# Patient Record
Sex: FEMALE | Race: WHITE | NOT HISPANIC OR LATINO | Employment: OTHER
[De-identification: names, ages, dates, MRNs, and addresses within clinical notes are randomized per-mention and may not be internally consistent; named-entity substitution may affect disease eponyms.]

---

## 2017-06-10 ENCOUNTER — HEALTH MAINTENANCE LETTER (OUTPATIENT)
Age: 62
End: 2017-06-10

## 2018-07-20 ENCOUNTER — TELEPHONE (OUTPATIENT)
Dept: OTOLARYNGOLOGY | Facility: CLINIC | Age: 63
End: 2018-07-20

## 2018-07-20 NOTE — TELEPHONE ENCOUNTER
PATIENT CALLED BACK WAS ONLY SEEN AT [facility]. I CALLED TO GET MRI'S PUSHED TO PACS AND TO FAX OVER AUDIOGRAM ALL THE RECORDS ARE IN CARE EVERYWHERE

## 2018-07-25 NOTE — TELEPHONE ENCOUNTER
FUTURE VISIT INFORMATION      FUTURE VISIT INFORMATION:    Date: 8-7-18    Time:     Location:   REFERRAL INFORMATION:    Referring provider:  self    Referring providers clinic:      Reason for visit/diagnosis  meniere's consult    RECORDS REQUESTED FROM:       Clinic name Comments Records Status Imaging Status   All records internal                                       RECORDS STATUS

## 2018-08-06 DIAGNOSIS — H81.09 MENIERE'S DISEASE: Primary | ICD-10-CM

## 2018-08-07 ENCOUNTER — OFFICE VISIT (OUTPATIENT)
Dept: AUDIOLOGY | Facility: CLINIC | Age: 63
End: 2018-08-07
Payer: COMMERCIAL

## 2018-08-07 ENCOUNTER — OFFICE VISIT (OUTPATIENT)
Dept: OTOLARYNGOLOGY | Facility: CLINIC | Age: 63
End: 2018-08-07
Payer: COMMERCIAL

## 2018-08-07 ENCOUNTER — PRE VISIT (OUTPATIENT)
Dept: OTOLARYNGOLOGY | Facility: CLINIC | Age: 63
End: 2018-08-07

## 2018-08-07 VITALS — HEIGHT: 64 IN | WEIGHT: 122 LBS | BODY MASS INDEX: 20.83 KG/M2

## 2018-08-07 DIAGNOSIS — H81.02 MENIERE'S DISEASE, LEFT: Primary | ICD-10-CM

## 2018-08-07 DIAGNOSIS — H93.8X3 EAR FULLNESS, BILATERAL: ICD-10-CM

## 2018-08-07 DIAGNOSIS — H93.8X2 EAR PRESSURE, LEFT: ICD-10-CM

## 2018-08-07 DIAGNOSIS — H93.12 TINNITUS OF LEFT EAR: ICD-10-CM

## 2018-08-07 DIAGNOSIS — H90.42 SENSORINEURAL HEARING LOSS (SNHL) OF LEFT EAR WITH UNRESTRICTED HEARING OF RIGHT EAR: ICD-10-CM

## 2018-08-07 DIAGNOSIS — H93.12 TINNITUS, LEFT: ICD-10-CM

## 2018-08-07 DIAGNOSIS — H61.23 EXCESSIVE CERUMEN IN BOTH EAR CANALS: ICD-10-CM

## 2018-08-07 DIAGNOSIS — H90.42 SENSORINEURAL HEARING LOSS (SNHL) OF LEFT EAR WITH UNRESTRICTED HEARING OF RIGHT EAR: Primary | ICD-10-CM

## 2018-08-07 RX ORDER — LIDOCAINE 50 MG/G
PATCH TOPICAL
COMMUNITY
Start: 2017-12-01 | End: 2023-04-02

## 2018-08-07 RX ORDER — ESTRADIOL 10 UG/1
10 INSERT VAGINAL
COMMUNITY
Start: 2018-05-03

## 2018-08-07 RX ORDER — AMLODIPINE BESYLATE 5 MG/1
5 TABLET ORAL DAILY
COMMUNITY

## 2018-08-07 RX ORDER — TRAMADOL HYDROCHLORIDE 50 MG/1
50 TABLET ORAL 4 TIMES DAILY PRN
COMMUNITY

## 2018-08-07 RX ORDER — PREGABALIN 75 MG/1
75 CAPSULE ORAL 3 TIMES DAILY
COMMUNITY

## 2018-08-07 RX ORDER — CLONAZEPAM 0.5 MG/1
0.5 TABLET ORAL 2 TIMES DAILY
COMMUNITY
Start: 2018-07-06

## 2018-08-07 RX ORDER — B-COMPLEX WITH VITAMIN C
50 TABLET ORAL 2 TIMES DAILY WITH MEALS
Qty: 120 TABLET | Refills: 4 | Status: SHIPPED | OUTPATIENT
Start: 2018-08-07 | End: 2018-10-06

## 2018-08-07 RX ORDER — VENLAFAXINE HYDROCHLORIDE 225 MG/1
225 TABLET, EXTENDED RELEASE ORAL DAILY
COMMUNITY

## 2018-08-07 RX ORDER — HYDROCHLOROTHIAZIDE 25 MG/1
25 TABLET ORAL DAILY
COMMUNITY

## 2018-08-07 RX ORDER — DIPHENHYDRAMINE HCL 25 MG
25 TABLET ORAL AT BEDTIME
Qty: 60 TABLET | Refills: 4 | Status: SHIPPED | OUTPATIENT
Start: 2018-08-07 | End: 2018-10-06

## 2018-08-07 ASSESSMENT — PATIENT HEALTH QUESTIONNAIRE - PHQ9
10. IF YOU CHECKED OFF ANY PROBLEMS, HOW DIFFICULT HAVE THESE PROBLEMS MADE IT FOR YOU TO DO YOUR WORK, TAKE CARE OF THINGS AT HOME, OR GET ALONG WITH OTHER PEOPLE: SOMEWHAT DIFFICULT
SUM OF ALL RESPONSES TO PHQ QUESTIONS 1-9: 12
SUM OF ALL RESPONSES TO PHQ QUESTIONS 1-9: 12

## 2018-08-07 ASSESSMENT — PAIN SCALES - GENERAL: PAINLEVEL: NO PAIN (0)

## 2018-08-07 NOTE — PROGRESS NOTES
Dear Ambrose Moon:    I had the pleasure of meeting Madelin Story in consultation today at the [facility] at your request.    HISTORY OF PRESENT ILLNESS: Patient is a 62-year-old in today for assessment of recent dizziness.  She also has hearing loss in the left ear, left tinnitus, and left ear pressure.  There has been discussion for possible left Ménière's.  Symptoms began about 2014 when she noticed hearing loss in the left ear.  She feels this has been progressing over the years since.  She also has had left tinnitus since then, it is constant although varies in severity.  She thinks the hearing loss and tinnitus increases with episodes of dizziness.  She also has left ear pressure, again this fluctuate and varies.  About 4 months ago she began noticing episodes of vertigo.  They are quite severe and associated with nausea.  She says she is unable to function when the attacks occur.  She does have nausea but has not vomited.  She has to just lay still until they pass, once they pass her balance is fine.  She denies any dysphasia, hoarseness, facial paresthesias.  She has seen an ENT physician elsewhere and was put on hydrochlorothiazide.  She did have an MRI scan July 10 which was normal by report.  She comes in today for assessment of above symptoms and discussion for treatment options.    ALLERGIES:    Allergies   Allergen Reactions     Penicillin [Penicillins] Swelling     Sulfa Drugs Nausea and Vomiting     Cefuroxime Nausea and Vomiting     PN: Vomiting     Clindamycin Rash     Gadolinium Derivatives Itching     PN: Just itching. Gadavist given on 7/9/14 for MR Brain WWO exam.        HABITS:   Alcohol use No    History   Smoking Status     Former Smoker     Quit date: 11/20/1976   Smokeless Tobacco     Never Used         PAST MEDICAL HISTORY: Please see today's intake form (for the remainder of the PMH) which I reviewed and signed.  No past medical history on  file.    FAMILY HISTORY/SOCIAL HISTORY: No family history on file. Social History     Social History     Marital status:      Spouse name: N/A     Number of children: 4     Years of education: N/A     Occupational History      None      Social History Main Topics     Smoking status: Former Smoker     Quit date: 11/20/1976     Smokeless tobacco: Never Used     Alcohol use No     Drug use: No     Sexual activity: Yes     Partners: Male     Birth control/ protection: Surgical      Comment: hysterectomy     Other Topics Concern     Not on file     Social History Narrative     Family History:  No immediate family history of ear problems, ear surgeries, dizziness or significant hearing issues.      REVIEW OF SYSTEMS: Patient Supplied Answers to Review of Systems   ENT ROS 8/7/2018   Constitutional Appetite change   Neurology Dizzy spells, Headache   Psychology Frequently feeling anxious   Ears, Nose, Throat Hearing loss, Ear pain, Ringing/noise in ears, Nasal congestion or drainage   Hematologic Easy bruising   Endocrine Frequent urination       The remainder of the 10 point ROS is negative    PHYSICIAL EXAMINATION:  Constitutional: The patient was well-groomed and in no acute distress.   Skin: Warm and pink.  Psychiatric: The patient's affect was calm, cooperative, and appropriate.   Respiratory: Breathing comfortably without stridor or exertion of accessory muscles.  Eyes: Pupils were equal and reactive. Extraocular movement intact.   Head: Normocephalic and atraumatic. No lesions or scars.  Ears: Both ears examined under the microscope for microscopic assessment and cleaning of cerumen.  Right side was cleaned with curettes.  Following cleaning, TM and middle ear looked normal.  Left ear was cleaned and examined using microscope, curette, and similar techniques.  TM and middle ear looked normal after cleaning.  Nose: Sinuses were nontender. Anterior rhinoscopy revealed midline septum and absence of purulence  or polyps.  Oral Cavity: Normal tongue, floor of mouth, buccal mucosa, and palate. No lesions or masses on inspection or palpation. No abnormal lymph tissue in the oropharynx.   Neck: The parotid is soft without masses. Supple with normal laryngeal and tracheal landmarks.   Lymphatic: There is no palpable lymphadenopathy or other masses in the neck.   Neurologic: Alert and oriented x 3. Cranial nerves III-XI within normal limits. Voice quality normal.  Cerebellar Function Tests:  Grossly normal    Audiogram: Audiogram performed shows normal hearing in the right ear.  She has a severe left sensorineural hearing loss with only 24% discrimination.  Right side shows 100% discrimination level.    MRI scan: Gadolinium was given and shows normal scan by report, we will get scans sent here for review.      IMPRESSION AND PLAN:   1. Left Ménière's: Description of symptoms certainly fit diagnosis of Ménière's and this was discussed in detail with them.  Would recommend a trial for addition of niacin and Benadryl to the diuretic she has been on.  Also discussed low-salt diet and avoidance of excess caffeine.  With the frequency and severity of attacks, severe hearing loss, also recommended a left transtympanic steroid injection.  Risk of this with persistent perforation, drainage, continued symptoms, unforeseen complications etc. all discussed.  Multiple questions answered.  They desire to proceed and injection completed without incident.  2. Left severe sensorineural hearing loss: Secondary to Ménière's and treatment as above begun, monitor for now.  3. Dizziness: Secondary to Ménière's and monitor with treatment that has been started.  4. Left tinnitus: Secondary to severe sensorineural hearing loss and left Ménière's, treatment begun and monitor.  5. Excessive cerumen: Clean today and no further treatment needed at this time, monitor.    PROCEDURE NOTE:  Pt placed supine under the microscope for transtympanic steroid  injection. The left ear was examined. A drop of phenol was placed in the posterior superior quadrant. Dexamethasone in a solution of 24 mg/ml was injected into the left middle ear using a 25 gauge needle. Total injection of 1 ml.  Pt remained supine for 20 minutes and was released to their own care.      Thank you very much for the opportunity to participate in the care of your patient.    Rick L Nissen MD

## 2018-08-07 NOTE — NURSING NOTE
"Chief Complaint   Patient presents with     Consult     New Dx: Meniere's and vertigo. No pain or drainage today. Pt has jaw pain with chewing and being seen at [facility].        Ht 1.613 m (5' 3.5\")  Wt 55.3 kg (122 lb)  BMI 21.27 kg/m2    SHIELA Agustin LPN    "

## 2018-08-07 NOTE — MR AVS SNAPSHOT
After Visit Summary   2018    Madelin Story    MRN: [id number]           Patient Information     Date Of Birth          1955        Visit Information        Provider Department      2018 3:45 PM Nissen, Rick L, MD [facility]        Today's Diagnoses     Meniere's disease, left    -  1      Care Instructions    Doctor Nissen would like to see you back in 2-3 weeks with another hearing test           Follow-ups after your visit        Your next 10 appointments already scheduled     Aug 28, 2018 10:00 AM CDT   Walk In From ENT with Chun Merritt [facility] ([facility])    [address] [place] [id number] [phone]            Aug 28, 2018 11:00 AM CDT   (Arrive by 10:45 AM)   Return Visit with Rick L Nissen, MD [facility] ([facility])    [address] [place] [id number] [phone]              Who to contact     Please call your clinic at [phone] to:    Ask questions about your health    Make or cancel appointments    Discuss your medicines    Learn about your test results    Speak to your doctor            Additional Information About Your Visit        MyChart Information     Vibliot is an electronic gateway that provides easy, online access to your medical records. With [employer]ik[employer], you can request a clinic appointment, read your test results, renew a prescription or communicate with your care team.     To sign up for Vibliot visit the website at [email or web address]"t   You will be asked to enter the access code listed below, as well as some personal information. Please follow the directions to create your username and password.     Your access code is: [email or web address]  Expires: 10/18/2018  3:51 PM     Your access code will  in 90 days. If you need help or a new code, please contact your [facility] or call [phone] for assistance.        Care EveryWhere ID     This is your Care EveryWhere ID. This could be used by other organizations to access your [facility] medical records  [id number]        Your Vitals Were     Height BMI (Body Mass Index)                1.613 m (5' 3.5\") 21.27 kg/m2           Blood Pressure from Last 3 Encounters:   10/24/08 120/78   06/25/07 110/70   01/12/07 130/80    Weight from Last 3 Encounters:   08/07/18 55.3 kg (122 lb)   10/24/08 58.5 kg (129 lb)   06/25/07 61.1 kg (134 lb 9.6 oz)              Today, you had the following     No orders found for display         Today's Medication Changes          These changes are accurate as of 8/7/18  4:51 PM.  If you have any questions, ask your nurse or doctor.               Start taking these medicines.        Dose/Directions    diphenhydrAMINE 25 MG tablet   Commonly known as:  BENADRYL ALLERGY   Used for:  Meniere's disease, left   Started by:  Nissen, Rick L, MD        Dose:  25 mg   Take 1 tablet (25 mg) by mouth At Bedtime   Quantity:  60 tablet   Refills:  4       niacin 50 MG tablet   Used for:  Meniere's disease, left   Started by:  Nissen, Rick L, MD        Dose:  50 mg   Take 1 tablet (50 mg) by mouth 2 times daily (with meals)   Quantity:  120 tablet   Refills:  4         Stop taking these medicines if you haven't already. Please contact your care team if you have questions.     PREMARIN PO   Stopped by:  Nissen, Rick L, MD                Where to get your medicines      These medications were sent to [facility]/pharmacy #[id number] - [place] - [address] AT [place] [address]  [address], [place] [address]     Phone:  [phone]     diphenhydrAMINE 25 MG tablet    niacin 50 MG tablet               Information about OPIOIDS     PRESCRIPTION OPIOIDS: WHAT YOU NEED TO KNOW   We gave you an opioid (narcotic) pain medicine. It is important to manage your pain, but opioids are not always the best " choice. You should first try all the other options your care team gave you. Take this medicine for as short a time (and as few doses) as possible.    Some activities can increase your pain, such as bandage changes or therapy sessions. It may help to take your pain medicine 30 to 60 minutes before these activities. Reduce your stress by getting enough sleep, working on hobbies you enjoy and practicing relaxation or meditation. Talk to your care team about ways to manage your pain beyond prescription opioids.    These medicines have risks:    DO NOT drive when on new or higher doses of pain medicine. These medicines can affect your alertness and reaction times, and you could be arrested for driving under the influence (DUI). If you need to use opioids long-term, talk to your care team about driving.    DO NOT operate heavy machinery    DO NOT do any other dangerous activities while taking these medicines.    DO NOT drink any alcohol while taking these medicines.     If the opioid prescribed includes acetaminophen, DO NOT take with any other medicines that contain acetaminophen. Read all labels carefully. Look for the word  acetaminophen  or  Tylenol.  Ask your pharmacist if you have questions or are unsure.    You can get addicted to pain medicines, especially if you have a history of addiction (chemical, alcohol or substance dependence). Talk to your care team about ways to reduce this risk.    All opioids tend to cause constipation. Drink plenty of water and eat foods that have a lot of fiber, such as fruits, vegetables, prune juice, apple juice and high-fiber cereal. Take a laxative (Miralax, milk of magnesia, Colace, Senna) if you don t move your bowels at least every other day. Other side effects include upset stomach, sleepiness, dizziness, throwing up, tolerance (needing more of the medicine to have the same effect), physical dependence and slowed breathing.    Store your pills in a secure place, locked if  possible. We will not replace any lost or stolen medicine. If you don t finish your medicine, please throw away (dispose) as directed by your pharmacist. The [place] Pollution Control Agency has more information about safe disposal: [email or web address]/living-[email or web address]/[email or web address]-unwanted-medications         Primary Care Provider Office Phone # Fax #    Ambrose Jacob [phone]       XXX NO INFO FOUND XXX XXX  XXX [place] [address]        Equal Access to Services     ELEUTERIO LOPEZ : Hadii aad ku hadasho Soomaali, waaxda luqadaha, qaybta kaalmada adeegyada, waxay idiin hayaan adeeg kharash francois . So [facility] [phone].    ATENCIÓN: Si habla español, tiene a giron disposición servicios gratuitos de asistencia lingüística. [facility] [phone].    We comply with applicable federal civil rights laws and [place] laws. We do not discriminate on the basis of race, color, national origin, age, disability, sex, sexual orientation, or gender identity.            Thank you!     Thank you for choosing [facility]  for your care. Our goal is always to provide you with excellent care. Hearing back from our patients is one way we can continue to improve our services. Please take a few minutes to complete the written survey that you may receive in the mail after your visit with us. Thank you!             Your Updated Medication List - Protect others around you: Learn how to safely use, store and throw away your medicines at www.disposemymeds.org.          This list is accurate as of 8/7/18  4:51 PM.  Always use your most recent med list.                   Brand Name Dispense Instructions for use Diagnosis    amLODIPine 5 MG tablet    NORVASC     Take 5 mg by mouth        clonazePAM 0.5 MG tablet    klonoPIN     Take 0.5 mg by mouth        diphenhydrAMINE 25 MG tablet    BENADRYL ALLERGY    60 tablet    Take 1 tablet (25 mg) by mouth At Bedtime    Meniere's disease, left       estradiol 10 MCG  Tabs vaginal tablet    VAGIFEM     Place 10 mcg vaginally        hydrochlorothiazide 25 MG tablet    HYDRODIURIL     Take 25 mg by mouth        ibuprofen 200 MG tablet    ADVIL/MOTRIN    120    2-3  hs        lidocaine 5 % Patch    LIDODERM     PLACE 1 PATCH ONTO THE SKIN EVERY 24 HOURS. (ON 12 AND OFF FOR 12 HRS)        niacin 50 MG tablet     120 tablet    Take 1 tablet (50 mg) by mouth 2 times daily (with meals)    Meniere's disease, left       pregabalin 75 MG capsule    LYRICA     Take 75 mg by mouth        traMADol 50 MG tablet    ULTRAM     Take 50 mg by mouth        traZODone 50 MG tablet    DESYREL    3 MONTHS    1 1/2 Q HS    Localized osteoarthrosis not specified whether primary or secondary, upper arm, Sleep disturbance, unspecified       venlafaxine 225 MG Tb24 24 hr tablet    EFFEXOR-ER     Take 225 mg by mouth

## 2018-08-07 NOTE — LETTER
8/7/2018       RE: Madelin Story  [address] [place] [address]     Dear Colleague,    Thank you for referring your patient, Madelin Story, to the [facility] at [facility]. Please see a copy of my visit note below.    Dear Ambrose Moon:    I had the pleasure of meeting Madelin Story in consultation today at the [facility] at your request.    HISTORY OF PRESENT ILLNESS: Patient is a 62-year-old in today for assessment of recent dizziness.  She also has hearing loss in the left ear, left tinnitus, and left ear pressure.  There has been discussion for possible left Ménière's.  Symptoms began about 2014 when she noticed hearing loss in the left ear.  She feels this has been progressing over the years since.  She also has had left tinnitus since then, it is constant although varies in severity.  She thinks the hearing loss and tinnitus increases with episodes of dizziness.  She also has left ear pressure, again this fluctuate and varies.  About 4 months ago she began noticing episodes of vertigo.  They are quite severe and associated with nausea.  She says she is unable to function when the attacks occur.  She does have nausea but has not vomited.  She has to just lay still until they pass, once they pass her balance is fine.  She denies any dysphasia, hoarseness, facial paresthesias.  She has seen an ENT physician elsewhere and was put on hydrochlorothiazide.  She did have an MRI scan July 10 which was normal by report.  She comes in today for assessment of above symptoms and discussion for treatment options.    ALLERGIES:    Allergies   Allergen Reactions     Penicillin [Penicillins] Swelling     Sulfa Drugs Nausea and Vomiting     Cefuroxime Nausea and Vomiting     PN: Vomiting     Clindamycin Rash     Gadolinium Derivatives Itching     PN: Just itching. Gadavist given on 7/9/14 for MR Brain WWO exam.         HABITS:   Alcohol use No    History   Smoking Status     Former Smoker     Quit date: 11/20/1976   Smokeless Tobacco     Never Used         PAST MEDICAL HISTORY: Please see today's intake form (for the remainder of the PMH) which I reviewed and signed.  No past medical history on file.    FAMILY HISTORY/SOCIAL HISTORY: No family history on file. Social History     Social History     Marital status:      Spouse name: N/A     Number of children: 4     Years of education: N/A     Occupational History      None      Social History Main Topics     Smoking status: Former Smoker     Quit date: 11/20/1976     Smokeless tobacco: Never Used     Alcohol use No     Drug use: No     Sexual activity: Yes     Partners: Male     Birth control/ protection: Surgical      Comment: hysterectomy     Other Topics Concern     Not on file     Social History Narrative       REVIEW OF SYSTEMS: Patient Supplied Answers to Review of Systems   ENT ROS 8/7/2018   Constitutional Appetite change   Neurology Dizzy spells, Headache   Psychology Frequently feeling anxious   Ears, Nose, Throat Hearing loss, Ear pain, Ringing/noise in ears, Nasal congestion or drainage   Hematologic Easy bruising   Endocrine Frequent urination       The remainder of the 10 point ROS is negative    PHYSICIAL EXAMINATION:  Constitutional: The patient was well-groomed and in no acute distress.   Skin: Warm and pink.  Psychiatric: The patient's affect was calm, cooperative, and appropriate.   Respiratory: Breathing comfortably without stridor or exertion of accessory muscles.  Eyes: Pupils were equal and reactive. Extraocular movement intact.   Head: Normocephalic and atraumatic. No lesions or scars.  Ears: Both ears examined under the microscope for microscopic assessment and cleaning of cerumen.  Right side was cleaned with curettes.  Following cleaning, TM and middle ear looked normal.  Left ear was cleaned and examined using microscope, curette, and  similar techniques.  TM and middle ear looked normal after cleaning.  Nose: Sinuses were nontender. Anterior rhinoscopy revealed midline septum and absence of purulence or polyps.  Oral Cavity: Normal tongue, floor of mouth, buccal mucosa, and palate. No lesions or masses on inspection or palpation. No abnormal lymph tissue in the oropharynx.   Neck: The parotid is soft without masses. Supple with normal laryngeal and tracheal landmarks.   Lymphatic: There is no palpable lymphadenopathy or other masses in the neck.   Neurologic: Alert and oriented x 3. Cranial nerves III-XI within normal limits. Voice quality normal.  Cerebellar Function Tests:  Grossly normal    Audiogram: Audiogram performed shows normal hearing in the right ear.  She has a severe left sensorineural hearing loss with only 24% discrimination.  Right side shows 100% discrimination level.    MRI scan: Gadolinium was given and shows normal scan by report, we will get scans sent here for review.      IMPRESSION AND PLAN:   1. Left Ménière's: Description of symptoms certainly fit diagnosis of Ménière's and this was discussed in detail with them.  Would recommend a trial for addition of niacin and Benadryl to the diuretic she has been on.  Also discussed low-salt diet and avoidance of excess caffeine.  With the frequency and severity of attacks, severe hearing loss, also recommended a left transtympanic steroid injection.  Risk of this with persistent perforation, drainage, continued symptoms, unforeseen complications etc. all discussed.  Multiple questions answered.  They desire to proceed and injection completed without incident.  2. Left severe sensorineural hearing loss: Secondary to Ménière's and treatment as above begun, monitor for now.  3. Dizziness: Secondary to Ménière's and monitor with treatment that has been started.  4. Left tinnitus: Secondary to severe sensorineural hearing loss and left Ménière's, treatment begun and  monitor.  5. Excessive cerumen: Clean today and no further treatment needed at this time, monitor.    Thank you very much for the opportunity to participate in the care of your patient.    Rick L Nissen MD

## 2018-08-07 NOTE — PROGRESS NOTES
AUDIOLOGY REPORT    SUMMARY: Audiology visit completed. See audiogram for results.      RECOMMENDATIONS: Follow-up with ENT.      Chun Ocasio  Audiologist  [place] License  #[id number]

## 2018-08-07 NOTE — MR AVS SNAPSHOT
After Visit Summary   2018    Madelin Story    MRN: [id number]           Patient Information     Date Of Birth          1955        Visit Information        Provider Department      2018 2:00 PM Chel Goldsmith AuD [facility] Audiology        Today's Diagnoses     Sensorineural hearing loss (SNHL) of left ear with unrestricted hearing of right ear    -  1    Tinnitus of left ear        Ear fullness, bilateral           Follow-ups after your visit        Your next 10 appointments already scheduled     Aug 07, 2018  3:45 PM CDT   (Arrive by 3:30 PM)   New Patient Visit with Rick L Nissen, MD M [facility] Ear Nose and Throat ([facility])    [address] [address] [phone]              Who to contact     Please call your clinic at [phone] to:    Ask questions about your health    Make or cancel appointments    Discuss your medicines    Learn about your test results    Speak to your doctor            Additional Information About Your Visit        MyChart Information     [employer] is an electronic gateway that provides easy, online access to your medical records. With [employer], you can request a clinic appointment, read your test results, renew a prescription or communicate with your care team.     To sign up for [employer] visit the website at [email or web address]   You will be asked to enter the access code listed below, as well as some personal information. Please follow the directions to create your username and password.     Your access code is: [email or web address]  Expires: 10/18/2018  3:51 PM     Your access code will  in 90 days. If you need help or a new code, please contact your [facility] or call [phone] for assistance.        Care EveryWhere ID     This is your Care EveryWhere ID. This could be used by other organizations to access your [facility]  records  [id number]         Blood Pressure from Last 3 Encounters:   10/24/08 120/78   06/25/07 110/70   01/12/07 130/80    Weight from Last 3 Encounters:   10/24/08 58.5 kg (129 lb)   06/25/07 61.1 kg (134 lb 9.6 oz)   01/12/07 64.5 kg (142 lb 3.2 oz)              We Performed the Following     AUDIOGRAM/TYMPANOGRAM - INTERFACE     [facility] Audiometry Thrshld Eval & Speech Recog (14707)     Tymps / Reflex   (16261)          Today's Medication Changes          These changes are accurate as of 8/7/18  3:24 PM.  If you have any questions, ask your nurse or doctor.               Stop taking these medicines if you haven't already. Please contact your care team if you have questions.     PREMARIN PO   Stopped by:  Nissen, Rick L, MD                    Primary Care Provider Office Phone # Fax #    Ambrose Tariq Jacob [phone]       XXX NO INFO FOUND XXX XXX  XXX [place] [address]        Equal Access to Services     [facility]: Hadii aad ku hadasho Soomaali, waaxda luqadaha, qaybta kaalmada adeegyada, savita parrish . So [facility] [phone].    ATENCIÓN: Si habla español, tiene a giron disposición servicios gratuitos de asistencia lingüística. Llame al [phone].    We comply with applicable federal civil rights laws and [place] laws. We do not discriminate on the basis of race, color, national origin, age, disability, sex, sexual orientation, or gender identity.            Thank you!     Thank you for choosing [facility] AUDIOLOGY  for your care. Our goal is always to provide you with excellent care. Hearing back from our patients is one way we can continue to improve our services. Please take a few minutes to complete the written survey that you may receive in the mail after your visit with us. Thank you!             Your Updated Medication List - Protect others around you: Learn how to safely use, store and throw away your medicines at www.disposemymeds.org.          This list is  accurate as of 8/7/18  3:24 PM.  Always use your most recent med list.                   Brand Name Dispense Instructions for use Diagnosis    amLODIPine 5 MG tablet    NORVASC     Take 5 mg by mouth        clonazePAM 0.5 MG tablet    klonoPIN     Take 0.5 mg by mouth        estradiol 10 MCG Tabs vaginal tablet    VAGIFEM     Place 10 mcg vaginally        hydrochlorothiazide 25 MG tablet    HYDRODIURIL     Take 25 mg by mouth        ibuprofen 200 MG tablet    ADVIL/MOTRIN    120    2-3  hs        lidocaine 5 % Patch    LIDODERM     PLACE 1 PATCH ONTO THE SKIN EVERY 24 HOURS. (ON 12 AND OFF FOR 12 HRS)        pregabalin 75 MG capsule    LYRICA     Take 75 mg by mouth        traMADol 50 MG tablet    ULTRAM     Take 50 mg by mouth        traZODone 50 MG tablet    DESYREL    3 MONTHS    1 1/2 Q HS    Localized osteoarthrosis not specified whether primary or secondary, upper arm, Sleep disturbance, unspecified       venlafaxine 225 MG Tb24 24 hr tablet    EFFEXOR-ER     Take 225 mg by mouth

## 2018-08-08 ASSESSMENT — PATIENT HEALTH QUESTIONNAIRE - PHQ9: SUM OF ALL RESPONSES TO PHQ QUESTIONS 1-9: 12

## 2018-08-28 ENCOUNTER — OFFICE VISIT (OUTPATIENT)
Dept: OTOLARYNGOLOGY | Facility: CLINIC | Age: 63
End: 2018-08-28
Payer: COMMERCIAL

## 2018-08-28 ENCOUNTER — OFFICE VISIT (OUTPATIENT)
Dept: AUDIOLOGY | Facility: CLINIC | Age: 63
End: 2018-08-28
Payer: COMMERCIAL

## 2018-08-28 DIAGNOSIS — H81.02 MENIERE'S DISEASE, LEFT: Primary | ICD-10-CM

## 2018-08-28 DIAGNOSIS — H72.92 EAR DRUM PERFORATION, LEFT: ICD-10-CM

## 2018-08-28 DIAGNOSIS — H90.5 SENSORINEURAL HEARING LOSS (SNHL) OF LEFT EAR, UNSPECIFIED HEARING STATUS ON CONTRALATERAL SIDE: ICD-10-CM

## 2018-08-28 DIAGNOSIS — H93.12 TINNITUS, LEFT: ICD-10-CM

## 2018-08-28 DIAGNOSIS — H90.42 SENSORINEURAL HEARING LOSS (SNHL) OF LEFT EAR WITH UNRESTRICTED HEARING OF RIGHT EAR: Primary | ICD-10-CM

## 2018-08-28 ASSESSMENT — PAIN SCALES - GENERAL: PAINLEVEL: NO PAIN (0)

## 2018-08-28 NOTE — MR AVS SNAPSHOT
After Visit Summary   2018    Madelin Story    MRN: [id number]           Patient Information     Date Of Birth          1955        Visit Information        Provider Department      2018 10:00 AM Chel Muniz, Chun VELIZ [facility] Audiology        Today's Diagnoses     Sensorineural hearing loss (SNHL) of left ear with unrestricted hearing of right ear    -  1       Follow-ups after your visit        Who to contact     Please call your clinic at [phone] to:    Ask questions about your health    Make or cancel appointments    Discuss your medicines    Learn about your test results    Speak to your doctor            Additional Information About Your Visit        MyChart Information     [employer] is an electronic gateway that provides easy, online access to your medical records. With [employer], you can request a clinic appointment, read your test results, renew a prescription or communicate with your care team.     To sign up for [employer] visit the website at [email or web address]   You will be asked to enter the access code listed below, as well as some personal information. Please follow the directions to create your username and password.     Your access code is: [email or web address]  Expires: 10/18/2018  3:51 PM     Your access code will  in 90 days. If you need help or a new code, please contact your [facility] or call [phone] for assistance.        Care EveryWhere ID     This is your Care EveryWhere ID. This could be used by other organizations to access your [place] medical records  [id number]         Blood Pressure from Last 3 Encounters:   10/24/08 120/78   07 110/70   07 130/80    Weight from Last 3 Encounters:   18 55.3 kg (122 lb)   10/24/08 58.5 kg (129 lb)   07 61.1 kg (134 lb 9.6 oz)              We Performed the Following     AUDIOGRAM/TYMPANOGRAM - INTERFACE     [facility] Audiometry Thrshld Eval & Speech Recog  (65531)     Tymps / Reflex   (77019)        Primary Care Provider    None Specified       No primary provider on file.        Equal Access to Services     ELEUTERIO LOPEZ : Hadii augustin wilhelm madi Gibbs, amaliada luverito, tiffany kalarisada trudy, savtia parker andressadorcas gonzalez laDarykaveh torres. So [facility] [phone].    ATENCIÓN: Si habla español, tiene a giron disposición servicios gratuitos de asistencia lingüística. Llame al [phone].    We comply with applicable federal civil rights laws and [place] laws. We do not discriminate on the basis of race, color, national origin, age, disability, sex, sexual orientation, or gender identity.            Thank you!     Thank you for choosing [facility] AUDIOLOGY  for your care. Our goal is always to provide you with excellent care. Hearing back from our patients is one way we can continue to improve our services. Please take a few minutes to complete the written survey that you may receive in the mail after your visit with us. Thank you!             Your Updated Medication List - Protect others around you: Learn how to safely use, store and throw away your medicines at www.disposemymeds.org.          This list is accurate as of 8/28/18 11:06 AM.  Always use your most recent med list.                   Brand Name Dispense Instructions for use Diagnosis    amLODIPine 5 MG tablet    NORVASC     Take 5 mg by mouth        clonazePAM 0.5 MG tablet    klonoPIN     Take 0.5 mg by mouth        diphenhydrAMINE 25 MG tablet    BENADRYL ALLERGY    60 tablet    Take 1 tablet (25 mg) by mouth At Bedtime    Meniere's disease, left       estradiol 10 MCG Tabs vaginal tablet    VAGIFEM     Place 10 mcg vaginally        hydrochlorothiazide 25 MG tablet    HYDRODIURIL     Take 25 mg by mouth        ibuprofen 200 MG tablet    ADVIL/MOTRIN    120    2-3  hs        lidocaine 5 % Patch    LIDODERM     PLACE 1 PATCH ONTO THE SKIN EVERY 24 HOURS. (ON 12 AND OFF FOR 12 HRS)        niacin 50 MG tablet      120 tablet    Take 1 tablet (50 mg) by mouth 2 times daily (with meals)    Meniere's disease, left       pregabalin 75 MG capsule    LYRICA     Take 75 mg by mouth        traMADol 50 MG tablet    ULTRAM     Take 50 mg by mouth        traZODone 50 MG tablet    DESYREL    3 MONTHS    1 1/2 Q HS    Localized osteoarthrosis not specified whether primary or secondary, upper arm, Sleep disturbance, unspecified       venlafaxine 225 MG Tb24 24 hr tablet    EFFEXOR-ER     Take 225 mg by mouth

## 2018-08-28 NOTE — PROGRESS NOTES
AUDIOLOGY REPORT    SUMMARY: Audiology visit completed. See audiogram for results.      RECOMMENDATIONS: Follow-up with ENT.    Neha Coyle, [facility]  Licensed Audiologist  [place] License #[id number]

## 2018-08-28 NOTE — LETTER
8/28/2018       RE: Madelin Story  [address] [place] [address]     Dear Colleague,    Thank you for referring your patient, Madelin Story, to the [facility] EAR NOSE AND THROAT at [facility]. Please see a copy of my visit note below.    Dear  No primary care provider on file.:    I had the pleasure of seeing Madelin Story in followup today at the [facility].    HISTORY OF PRESENT ILLNESS: Patient is a 62-year-old in today with her  for follow-up from her last visit 3 weeks ago.  She underwent her initial left transtympanic steroid injection at that time.  She had a normal MRI in July.  She was having episodic vertigo with associated left increased tinnitus, hearing loss, and pressure.  She was having 5-7 episodes per week for the 4 months before the injection.  On her follow-up today, she says she has not had any episodes of dizziness since injection.  She feels a hearing has slowly been improving on the left ear, not as good as the right but improved.  She still has left tinnitus but it also seems to be improved.  She denies any dysphasia, hoarseness, facial paresthesias.    MEDICATIONS: Please refer to the detailed medication reconciliation performed by my nurse today, which I have reviewed and signed.     ALLERGIES:    Allergies   Allergen Reactions     Penicillin [Penicillins] Swelling     Sulfa Drugs Nausea and Vomiting     Cefuroxime Nausea and Vomiting     PN: Vomiting     Clindamycin Rash     Gadolinium Derivatives Itching     PN: Just itching. Gadavist given on 7/9/14 for MR Brain WWO exam.        HABITS:   Alcohol use No    History   Smoking Status     Former Smoker     Quit date: 11/20/1976   Smokeless Tobacco     Never Used         PAST MEDICAL HISTORY:  Please see today's intake form (for the remainder of the PMH) which I reviewed and signed.  No past medical history on file.    FAMILY HISTORY/SOCIAL HISTORY:  No  family history on file. Social History     Social History     Marital status:      Spouse name: N/A     Number of children: 4     Years of education: N/A     Occupational History      None      Social History Main Topics     Smoking status: Former Smoker     Quit date: 11/20/1976     Smokeless tobacco: Never Used     Alcohol use No     Drug use: No     Sexual activity: Yes     Partners: Male     Birth control/ protection: Surgical      Comment: hysterectomy     Other Topics Concern     Not on file     Social History Narrative       REVIEW OF SYSTEMS: Patient Supplied Answers to Review of Systems   ENT ROS 8/28/2018   Constitutional -   Neurology Headache   Psychology -   Ears, Nose, Throat Ringing/noise in ears, Trouble swallowing   Musculoskeletal Sore or stiff joints   Hematologic Easy bruising   Endocrine -       The remainder of the 10 point ROS is negative    PHYSICIAL EXAMINATION:  Constitutional: The patient was well-groomed and in no acute distress.   Skin: Warm and pink.  Psychiatric: The patient's affect was calm, cooperative, and appropriate.   Respiratory: Breathing comfortably without stridor or exertion of accessory muscles.  Eyes: Pupils were equal and reactive. Extraocular movement intact.   Head: Normocephalic and atraumatic. No lesions or scars.  Ears: Both ears examined on the microscope with the finding of crusting on the left TM.  Under high-power magnification and with a right angled pick, the crust was removed and it looks like the old injection site has healed.  Middle ear looks to be air-filled.  The right side was cleaned and examined using microscope, curette, and similar techniques.  TM and middle ear looked normal after cleaning.  Nose: Sinuses were nontender. Anterior rhinoscopy revealed midline septum and absence of purulence or polyps.  Oral Cavity: Normal tongue, floor of mouth, buccal mucosa, and palate. No abnormal lymph tissue in the oropharynx.   Neck: The parotid is  soft without masses. Supple with normal laryngeal and tracheal landmarks.   Lymphatic: There is no palpable lymphadenopathy or other masses in the neck.   Neurologic: Alert and oriented x 3. Cranial nerves III-XI within normal limits. Voice quality normal.  Cerebellar Function Tests:  Grossly normal    Audiogram: Audiogram performed shows normal hearing in the right ear.  The left ear shows a significant improvement of about 30-40 dB in pure-tone levels.  The left discrimination has increased to 100% from 24%.    IMPRESSION AND PLAN:   1. Left Ménière's: Significant improvement, she has been on medications and having no problems from the past injection.  Still has slight asymmetry.  Recommend another injection today which completed without incident.  She will follow-up in 3 weeks to monitor.  2. Left tinnitus: Secondary to left Ménière's as above.  Treatment as above, monitor.  3. Left asymmetrical sensorineural hearing loss: Significant improvement following injection, recommend injection today which completed without incident.   4. Left TM perforation: Stable, recommend monitor.    PROCEDURE NOTE: Patient placed supine of the microscope.  Under high-power magnification, the left ear was examined and the drop of phenol was placed on the posterior superior quadrant.  Dexamethasone and a solution of 24 mg/cc was injected into the left middle ear using 25-gauge needle.  Total injection 1 cc.  She remained supine for 20 minutes and was released to her own care.    Thank you very much for the opportunity to participate in the care of your patient.    Rick L Nissen MD            Again, thank you for allowing me to participate in the care of your patient.      Sincerely,    Rick L. Nissen, MD

## 2018-08-28 NOTE — LETTER
Date:August 29, 2018      Patient was self referred, no letter generated. Do not send.        [facility] Health Information

## 2018-08-28 NOTE — MR AVS SNAPSHOT
After Visit Summary   2018    Madelin Story    MRN: [id number]           Patient Information     Date Of Birth          1955        Visit Information        Provider Department      2018 11:00 AM Nissen, Rick L, MD [facility]        Care Instructions    Doctor Nissen will see you again in 1 month with another hearing test.          Follow-ups after your visit        Your next 10 appointments already scheduled     Oct 02, 2018 10:00 AM CDT   Walk In From ENT with Chun Merritt [facility] Audiology ([facility])    [address] [place] [address] [phone]            Oct 02, 2018 11:00 AM CDT   (Arrive by 10:45 AM)   Return Visit with Rick L Nissen, MD M [facility] Ear Nose and Throat ([facility])    [address] [place] [address] [phone]              Who to contact     Please call your clinic at [phone] to:    Ask questions about your health    Make or cancel appointments    Discuss your medicines    Learn about your test results    Speak to your doctor            Additional Information About Your Visit        MyChart Information     [employer] is an electronic gateway that provides easy, online access to your medical records. With [employer], you can request a clinic appointment, read your test results, renew a prescription or communicate with your care team.     To sign up for [employer] visit the website at [email or web address]   You will be asked to enter the access code listed below, as well as some personal information. Please follow the directions to create your username and password.     Your access code is: [email or web address]  Expires: 10/18/2018  3:51 PM     Your access code will  in 90 days. If you need help or a new code, please contact your [facility] or call [phone] for assistance.        Care  EveryWhere ID     This is your Care EveryWhere ID. This could be used by other organizations to access your [place] medical records  [id number]         Blood Pressure from Last 3 Encounters:   10/24/08 120/78   06/25/07 110/70   01/12/07 130/80    Weight from Last 3 Encounters:   08/07/18 55.3 kg (122 lb)   10/24/08 58.5 kg (129 lb)   06/25/07 61.1 kg (134 lb 9.6 oz)              Today, you had the following     No orders found for display       Primary Care Provider    None Specified       No primary provider on file.        Equal Access to Services     [facility]: Hadliam Gibbs, piero longoria, tiffany yates, savita parrish . So [facility] [phone].    ATENCIÓN: Si habla español, tiene a giron disposición servicios gratuitos de asistencia lingüística. Llame al [phone].    We comply with applicable federal civil rights laws and [place] laws. We do not discriminate on the basis of race, color, national origin, age, disability, sex, sexual orientation, or gender identity.            Thank you!     Thank you for choosing [facility] EAR NOSE AND THROAT  for your care. Our goal is always to provide you with excellent care. Hearing back from our patients is one way we can continue to improve our services. Please take a few minutes to complete the written survey that you may receive in the mail after your visit with us. Thank you!             Your Updated Medication List - Protect others around you: Learn how to safely use, store and throw away your medicines at www.disposemymeds.org.          This list is accurate as of 8/28/18 11:53 AM.  Always use your most recent med list.                   Brand Name Dispense Instructions for use Diagnosis    amLODIPine 5 MG tablet    NORVASC     Take 5 mg by mouth        clonazePAM 0.5 MG tablet    klonoPIN     Take 0.5 mg by mouth        diphenhydrAMINE 25 MG tablet    BENADRYL ALLERGY    60 tablet    Take 1 tablet (25  mg) by mouth At Bedtime    Meniere's disease, left       estradiol 10 MCG Tabs vaginal tablet    VAGIFEM     Place 10 mcg vaginally        hydrochlorothiazide 25 MG tablet    HYDRODIURIL     Take 25 mg by mouth        ibuprofen 200 MG tablet    ADVIL/MOTRIN    120    2-3  hs        lidocaine 5 % Patch    LIDODERM     PLACE 1 PATCH ONTO THE SKIN EVERY 24 HOURS. (ON 12 AND OFF FOR 12 HRS)        niacin 50 MG tablet     120 tablet    Take 1 tablet (50 mg) by mouth 2 times daily (with meals)    Meniere's disease, left       pregabalin 75 MG capsule    LYRICA     Take 75 mg by mouth        traMADol 50 MG tablet    ULTRAM     Take 50 mg by mouth        traZODone 50 MG tablet    DESYREL    3 MONTHS    1 1/2 Q HS    Localized osteoarthrosis not specified whether primary or secondary, upper arm, Sleep disturbance, unspecified       venlafaxine 225 MG Tb24 24 hr tablet    EFFEXOR-ER     Take 225 mg by mouth

## 2018-08-29 NOTE — PROGRESS NOTES
Dear  No primary care provider on file.:    I had the pleasure of seeing Madelin Story in followup today at the [facility].    HISTORY OF PRESENT ILLNESS: Patient is a 62-year-old in today with her  for follow-up from her last visit 3 weeks ago.  She underwent her initial left transtympanic steroid injection at that time.  She had a normal MRI in July.  She was having episodic vertigo with associated left increased tinnitus, hearing loss, and pressure.  She was having 5-7 episodes per week for the 4 months before the injection.  On her follow-up today, she says she has not had any episodes of dizziness since injection.  She feels a hearing has slowly been improving on the left ear, not as good as the right but improved.  She still has left tinnitus but it also seems to be improved.  She denies any dysphasia, hoarseness, facial paresthesias.    MEDICATIONS: Please refer to the detailed medication reconciliation performed by my nurse today, which I have reviewed and signed.     ALLERGIES:    Allergies   Allergen Reactions     Penicillin [Penicillins] Swelling     Sulfa Drugs Nausea and Vomiting     Cefuroxime Nausea and Vomiting     PN: Vomiting     Clindamycin Rash     Gadolinium Derivatives Itching     PN: Just itching. Gadavist given on 7/9/14 for MR Brain WWO exam.        HABITS:   Alcohol use No    History   Smoking Status     Former Smoker     Quit date: 11/20/1976   Smokeless Tobacco     Never Used         PAST MEDICAL HISTORY:  Please see today's intake form (for the remainder of the PMH) which I reviewed and signed.  No past medical history on file.    FAMILY HISTORY/SOCIAL HISTORY:  No family history on file. Social History     Social History     Marital status:      Spouse name: N/A     Number of children: 4     Years of education: N/A     Occupational History      None      Social History Main Topics     Smoking status: Former Smoker     Quit date:  11/20/1976     Smokeless tobacco: Never Used     Alcohol use No     Drug use: No     Sexual activity: Yes     Partners: Male     Birth control/ protection: Surgical      Comment: hysterectomy     Other Topics Concern     Not on file     Social History Narrative       REVIEW OF SYSTEMS: Patient Supplied Answers to Review of Systems   ENT ROS 8/28/2018   Constitutional -   Neurology Headache   Psychology -   Ears, Nose, Throat Ringing/noise in ears, Trouble swallowing   Musculoskeletal Sore or stiff joints   Hematologic Easy bruising   Endocrine -       The remainder of the 10 point ROS is negative    PHYSICIAL EXAMINATION:  Constitutional: The patient was well-groomed and in no acute distress.   Skin: Warm and pink.  Psychiatric: The patient's affect was calm, cooperative, and appropriate.   Respiratory: Breathing comfortably without stridor or exertion of accessory muscles.  Eyes: Pupils were equal and reactive. Extraocular movement intact.   Head: Normocephalic and atraumatic. No lesions or scars.  Ears: Both ears examined on the microscope with the finding of crusting on the left TM.  Under high-power magnification and with a right angled pick, the crust was removed and it looks like the old injection site has healed.  Middle ear looks to be air-filled.  The right side was cleaned and examined using microscope, curette, and similar techniques.  TM and middle ear looked normal after cleaning.  Nose: Sinuses were nontender. Anterior rhinoscopy revealed midline septum and absence of purulence or polyps.  Oral Cavity: Normal tongue, floor of mouth, buccal mucosa, and palate. No abnormal lymph tissue in the oropharynx.   Neck: The parotid is soft without masses. Supple with normal laryngeal and tracheal landmarks.   Lymphatic: There is no palpable lymphadenopathy or other masses in the neck.   Neurologic: Alert and oriented x 3. Cranial nerves III-XI within normal limits. Voice quality normal.  Cerebellar Function  Tests:  Grossly normal    Audiogram: Audiogram performed shows normal hearing in the right ear.  The left ear shows a significant improvement of about 30-40 dB in pure-tone levels.  The left discrimination has increased to 100% from 24%.    IMPRESSION AND PLAN:   1. Left Ménière's: Significant improvement, she has been on medications and having no problems from the past injection.  Still has slight asymmetry.  Recommend another injection today which completed without incident.  She will follow-up in 3 weeks to monitor.  2. Left tinnitus: Secondary to left Ménière's as above.  Treatment as above, monitor.  3. Left asymmetrical sensorineural hearing loss: Significant improvement following injection, recommend injection today which completed without incident.   4. Left TM perforation: Stable, recommend monitor.    PROCEDURE NOTE: Patient placed supine of the microscope.  Under high-power magnification, the left ear was examined and the drop of phenol was placed on the posterior superior quadrant.  Dexamethasone and a solution of 24 mg/cc was injected into the left middle ear using 25-gauge needle.  Total injection 1 cc.  She remained supine for 20 minutes and was released to her own care.    Thank you very much for the opportunity to participate in the care of your patient.    Rick L Nissen MD

## 2018-10-02 ENCOUNTER — OFFICE VISIT (OUTPATIENT)
Dept: OTOLARYNGOLOGY | Facility: CLINIC | Age: 63
End: 2018-10-02
Payer: COMMERCIAL

## 2018-10-02 ENCOUNTER — OFFICE VISIT (OUTPATIENT)
Dept: AUDIOLOGY | Facility: CLINIC | Age: 63
End: 2018-10-02
Payer: COMMERCIAL

## 2018-10-02 VITALS — HEIGHT: 63 IN | BODY MASS INDEX: 21.62 KG/M2 | WEIGHT: 122 LBS

## 2018-10-02 DIAGNOSIS — H61.22 CERUMEN DEBRIS ON TYMPANIC MEMBRANE OF LEFT EAR: ICD-10-CM

## 2018-10-02 DIAGNOSIS — H90.42 SENSORINEURAL HEARING LOSS (SNHL) OF LEFT EAR WITH UNRESTRICTED HEARING OF RIGHT EAR: Primary | ICD-10-CM

## 2018-10-02 DIAGNOSIS — H81.02 MENIERE'S DISEASE, LEFT: Primary | ICD-10-CM

## 2018-10-02 ASSESSMENT — PAIN SCALES - GENERAL: PAINLEVEL: MILD PAIN (2)

## 2018-10-02 NOTE — MR AVS SNAPSHOT
"              After Visit Summary   10/2/2018    Madelin Story    MRN: [id number]           Patient Information     Date Of Birth          1955        Visit Information        Provider Department      10/2/2018 11:00 AM Nissen, Rick L, MD [facility]        Today's Diagnoses     Meniere's disease, left    -  1    Cerumen debris on tympanic membrane of left ear          Care Instructions    1.  Patient to follow up the ENT in 6 months with an audiogram.  2.  Patient to call the clinic sooner with questions or concerns: [phone], option #3.            Follow-ups after your visit        Who to contact     Please call your clinic at [phone] to:    Ask questions about your health    Make or cancel appointments    Discuss your medicines    Learn about your test results    Speak to your doctor            Additional Information About Your Visit        Care EveryWhere ID     This is your Care EveryWhere ID. This could be used by other organizations to access your [place] medical records  [id number]        Your Vitals Were     Height BMI (Body Mass Index)                1.59 m (5' 2.6\") 21.89 kg/m2           Blood Pressure from Last 3 Encounters:   10/24/08 120/78   06/25/07 110/70   01/12/07 130/80    Weight from Last 3 Encounters:   10/02/18 55.3 kg (122 lb)   08/07/18 55.3 kg (122 lb)   10/24/08 58.5 kg (129 lb)              We Performed the Following     BINOCULAR MICROSCOPY        Primary Care Provider    None Specified       No primary provider on file.        Equal Access to Services     [facility] : Hadii augustin Gibbs, waaxda luqadaha, qaybta kaalmada trudy, savita parrish . So [facility] [phone].    ATENCIÓN: Si habla español, tiene a giron disposición servicios gratuitos de asistencia lingüística. Llame al [phone].    We comply with applicable federal civil rights laws and [place] laws. We do not discriminate on the basis of race, color, " national origin, age, disability, sex, sexual orientation, or gender identity.            Thank you!     Thank you for choosing [facility] EAR NOSE AND TH[facility]AT  for your care. Our goal is always to provide you with excellent care. Hearing back from our patients is one way we can continue to improve our services. Please take a few minutes to complete the written survey that you may receive in the mail after your visit with us. Thank you!             Your Updated Medication List - Protect others around you: Learn how to safely use, store and throw away your medicines at www.disposemymeds.org.          This list is accurate as of 10/2/18  3:52 PM.  Always use your most recent med list.                   Brand Name Dispense Instructions for use Diagnosis    amLODIPine 5 MG tablet    NORVASC     Take 5 mg by mouth        clonazePAM 0.5 MG tablet    klonoPIN     Take 0.5 mg by mouth        diphenhydrAMINE 25 MG tablet    BENADRYL ALLERGY    60 tablet    Take 1 tablet (25 mg) by mouth At Bedtime    Meniere's disease, left       estradiol 10 MCG Tabs vaginal tablet    VAGIFEM     Place 10 mcg vaginally        hydrochlorothiazide 25 MG tablet    HYDRODIURIL     Take 25 mg by mouth        ibuprofen 200 MG tablet    ADVIL/MOTRIN    120    2-3  hs        lidocaine 5 % Patch    LIDODERM     PLACE 1 PATCH ONTO THE SKIN EVERY 24 HOURS. (ON 12 AND OFF FOR 12 HRS)        niacin 50 MG tablet     120 tablet    Take 1 tablet (50 mg) by mouth 2 times daily (with meals)    Meniere's disease, left       pregabalin 75 MG capsule    LYRICA     Take 75 mg by mouth        traMADol 50 MG tablet    ULTRAM     Take 50 mg by mouth        traZODone 50 MG tablet    DESYREL    3 MONTHS    1 1/2 Q HS    Localized osteoarthrosis not specified whether primary or secondary, upper arm, Sleep disturbance, unspecified       venlafaxine 225 MG Tb24 24 hr tablet    EFFEXOR-ER     Take 225 mg by mouth

## 2018-10-02 NOTE — PATIENT INSTRUCTIONS
1.  Patient to follow up the ENT in 6 months with an audiogram.  2.  Patient to call the clinic sooner with questions or concerns: [phone], option #3.

## 2018-10-02 NOTE — PROGRESS NOTES
AUDIOLOGY REPORT    SUMMARY: Audiology visit completed. See audiogram for results.      RECOMMENDATIONS: Follow-up with ENT.      Chun Ocasio  Audiologist  [place] License  #[id number]

## 2018-10-02 NOTE — LETTER
10/2/2018       RE: Madelin Story  [address] [place] [address]     Dear Colleague,    Thank you for referring your patient, Madelin Story, to the [facility] at [facility]. Please see a copy of my visit note below.    Dear Dr. Soliz primary care provider on file.:    I had the pleasure of seeing Madelin Story in followup today at the [facility].    CHIEF COMPLAINT: Left Ménière's    HISTORY OF PRESENT ILLNESS: Patient is a 62-year-old in today for follow-up from her last visit 8/28/18.  At that visit, she underwent a second steroid injection for suspected left Ménière's.  She again was having 5-7 episodes per week of vertigo.  She is underwent 2 injection and is doing quite well.  Normal MRI scan.  At her initial assessment, she also had a significant left sensorineural hearing loss.  That is showing improvement.  On her follow-up today, she feels her hearing is symmetrical now.  She denies any tinnitus.  Has not had any dizziness since her last visit.  She is still on 3 Ménière's medications.    MEDICATIONS: Please refer to the detailed medication reconciliation performed by my nurse today, which I have reviewed and signed.     ALLERGIES:    Allergies   Allergen Reactions     Penicillin [Penicillins] Swelling     Sulfa Drugs Nausea and Vomiting     Cefuroxime Nausea and Vomiting     PN: Vomiting     Clindamycin Rash     Gadolinium Derivatives Itching     PN: Just itching. Gadavist given on 7/9/14 for MR Brain WWO exam.        HABITS:   Alcohol use No    History   Smoking Status     Former Smoker     Packs/day: 1.00     Years: 10.00     Types: Cigarettes     Start date: 6/15/1973     Quit date: 1/1/1985   Smokeless Tobacco     Never Used         PAST MEDICAL HISTORY:  Please see today's intake form (for the remainder of the PMH) which I reviewed and signed.  Past Medical History:   Diagnosis Date     Anxiety       Arthritis      Hearing problem      Meniere's disease      Osteoporosis      Tinnitus        FAMILY HISTORY/SOCIAL HISTORY:    Family History   Problem Relation Age of Onset     Cancer Father      Depression Sister      Ear Disorder No family hx of     Social History     Social History     Marital status:      Spouse name: N/A     Number of children: 4     Years of education: N/A     Occupational History      None      Social History Main Topics     Smoking status: Former Smoker     Packs/day: 1.00     Years: 10.00     Types: Cigarettes     Start date: 6/15/1973     Quit date: 1/1/1985     Smokeless tobacco: Never Used     Alcohol use No     Drug use: No     Sexual activity: Yes     Partners: Male     Birth control/ protection: None      Comment: hysterectomy     Other Topics Concern     Not on file     Social History Narrative       REVIEW OF SYSTEMS: Patient Supplied Answers to Review of Systems   ENT ROS 10/2/2018   Constitutional -   Neurology Headache   Psychology -   Ears, Nose, Throat Ear pain   Musculoskeletal Neck pain   Hematologic Easy bruising   Endocrine -       The remainder of the 10 point ROS is negative    PHYSICIAL EXAMINATION:  Constitutional: The patient was well-groomed and in no acute distress.   Skin: Warm and pink.  Psychiatric: The patient's affect was calm, cooperative, and appropriate.   Respiratory: Breathing comfortably without stridor or exertion of accessory muscles.  Eyes: Pupils were equal and reactive. Extraocular movement intact.   Head: Normocephalic and atraumatic. No lesions or scars.  Ears: Both ears examined she does have some crusting on the left TM.  She was placed supine on the microscope and under high-power magnification, using a right angled hook, the crusting was mobilized and removed.  It shows the old injection site to have healed and TM is intact.  Air-filled middle ear space.  The right side was examined and cleaned of mild cerumen with curettes.  Following  cleaning, TM and middle ear looked normal.  Nose: Sinuses were nontender. Anterior rhinoscopy revealed midline septum and absence of purulence or polyps.  Oral Cavity: Normal tongue, floor of mouth, buccal mucosa, and palate. No abnormal lymph tissue in the oropharynx.   Neck: The parotid is soft without masses. Supple with normal laryngeal and tracheal landmarks.   Lymphatic: There is no palpable lymphadenopathy or other masses in the neck.   Neurologic: Alert and oriented x 3. Cranial nerves III-XI within normal limits. Voice quality normal.  Cerebellar Function Tests:  Grossly normal    Audiogram: Audiogram performed shows sensory normal hearing in both ears through all frequencies.  She has 100% discrimination bilaterally.    IMPRESSION AND PLAN:   1. Left Ménière's: Clinically stable, recommend continue with 3 Ménière's medications.  Monitor with follow-up in audio in 6 months, sooner with any return of symptoms or problems.  2. Left TM debris: Clean today under microscope with old injection/perforation healed.  No further treatment at this time.    Recommend follow-up in 6 months with audio to reassess, she is come in sooner with any concerns or problems.    Thank you very much for the opportunity to participate in the care of your patient.    Rick L Nissen MD            Again, thank you for allowing me to participate in the care of your patient.      Sincerely,    Rick L. Nissen, MD

## 2018-10-02 NOTE — MR AVS SNAPSHOT
After Visit Summary   10/2/2018    Madelin Story    MRN: [id number]           Patient Information     Date Of Birth          1955        Visit Information        Provider Department      10/2/2018 10:00 AM Chel Goldsmith AuD [facility] Audiology        Today's Diagnoses     Sensorineural hearing loss (SNHL) of left ear with unrestricted hearing of right ear    -  1       Follow-ups after your visit        Your next 10 appointments already scheduled     Oct 02, 2018 11:00 AM CDT   (Arrive by 10:45 AM)   Return Visit with Rick L Nissen, MD   [facility] Ear Nose [facility] Throat ([facility])    [address]  4th [address]  [facility] [address] [phone]              Who to contact     Please call your clinic at [phone] to:    Ask questions about your health    Make or cancel appointments    Discuss your medicines    Learn about your test results    Speak to your doctor            Additional Information About Your Visit        Care EveryWhere ID     This is your Care EveryWhere ID. This could be used by other organizations to access your [place] medical records  [id number]         Blood Pressure from Last 3 Encounters:   10/24/08 120/78   06/25/07 110/70   01/12/07 130/80    Weight from Last 3 Encounters:   08/07/18 55.3 kg (122 lb)   10/24/08 58.5 kg (129 lb)   06/25/07 61.1 kg (134 lb 9.6 oz)              We Performed the Following     AUDIOGRAM/TYMPANOGRAM - INTERFACE     [facility] Recog (92183)     Tymps / Reflex   ([id number])        Primary Care Provider    None Specified       No primary provider on file.        Equal Access to Services     [facility]: Hadii aad melonie hadasho Soomaali, waaxda luqadaha, qaybta kaalmada adesavita ho . So [facility] [phone].    ATENCIÓN: Si habla español, tiene a giron disposición servicios gratuitos de asistencia lingüística. Llame al [phone].    We  comply with applicable federal civil rights laws and [place] laws. We do not discriminate on the basis of race, color, national origin, age, disability, sex, sexual orientation, or gender identity.            Thank you!     Thank you for choosing [facility] AUDIOLOGY  for your care. Our goal is always to provide you with excellent care. Hearing back from our patients is one way we can continue to improve our services. Please take a few minutes to complete the written survey that you may receive in the mail after your visit with us. Thank you!             Your Updated Medication List - Protect others around you: Learn how to safely use, store and throw away your medicines at www.disposemymeds.org.          This list is accurate as of 10/2/18 10:34 AM.  Always use your most recent med list.                   Brand Name Dispense Instructions for use Diagnosis    amLODIPine 5 MG tablet    NORVASC     Take 5 mg by mouth        clonazePAM 0.5 MG tablet    klonoPIN     Take 0.5 mg by mouth        diphenhydrAMINE 25 MG tablet    BENADRYL ALLERGY    60 tablet    Take 1 tablet (25 mg) by mouth At Bedtime    Meniere's disease, left       estradiol 10 MCG Tabs vaginal tablet    VAGIFEM     Place 10 mcg vaginally        hydrochlorothiazide 25 MG tablet    HYDRODIURIL     Take 25 mg by mouth        ibuprofen 200 MG tablet    ADVIL/MOTRIN    120    2-3  hs        lidocaine 5 % Patch    LIDODERM     PLACE 1 PATCH ONTO THE SKIN EVERY 24 HOURS. (ON 12 AND OFF FOR 12 HRS)        niacin 50 MG tablet     120 tablet    Take 1 tablet (50 mg) by mouth 2 times daily (with meals)    Meniere's disease, left       pregabalin 75 MG capsule    LYRICA     Take 75 mg by mouth        traMADol 50 MG tablet    ULTRAM     Take 50 mg by mouth        traZODone 50 MG tablet    DESYREL    3 MONTHS    1 1/2 Q HS    Localized osteoarthrosis not specified whether primary or secondary, upper arm, Sleep disturbance, unspecified       venlafaxine 225 MG Tb24 24  hr tablet    EFFEXOR-ER     Take 225 mg by mouth

## 2018-10-02 NOTE — NURSING NOTE
"Chief Complaint   Patient presents with     RECHECK     meniere's consult      Height 1.59 m (5' 2.6\"), weight 55.3 kg (122 lb).    Smooth Brewster LPN    "

## 2018-10-02 NOTE — PROGRESS NOTES
Dear  No primary care provider on file.:    I had the pleasure of seeing Madelin Story in followup today at the [facility].    CHIEF COMPLAINT: Left Ménière's    HISTORY OF PRESENT ILLNESS: Patient is a 62-year-old in today for follow-up from her last visit 8/28/18.  At that visit, she underwent a second steroid injection for suspected left Ménière's.  She again was having 5-7 episodes per week of vertigo.  She is underwent 2 injection and is doing quite well.  Normal MRI scan.  At her initial assessment, she also had a significant left sensorineural hearing loss.  That is showing improvement.  On her follow-up today, she feels her hearing is symmetrical now.  She denies any tinnitus.  Has not had any dizziness since her last visit.  She is still on 3 Ménière's medications.    MEDICATIONS: Please refer to the detailed medication reconciliation performed by my nurse today, which I have reviewed and signed.     ALLERGIES:    Allergies   Allergen Reactions     Penicillin [Penicillins] Swelling     Sulfa Drugs Nausea and Vomiting     Cefuroxime Nausea and Vomiting     PN: Vomiting     Clindamycin Rash     Gadolinium Derivatives Itching     PN: Just itching. Gadavist given on 7/9/14 for MR Brain WWO exam.        HABITS:   Alcohol use No    History   Smoking Status     Former Smoker     Packs/day: 1.00     Years: 10.00     Types: Cigarettes     Start date: 6/15/1973     Quit date: 1/1/1985   Smokeless Tobacco     Never Used         PAST MEDICAL HISTORY:  Please see today's intake form (for the remainder of the PMH) which I reviewed and signed.  Past Medical History:   Diagnosis Date     Anxiety      Arthritis      Hearing problem      Meniere's disease      Osteoporosis      Tinnitus        FAMILY HISTORY/SOCIAL HISTORY:    Family History   Problem Relation Age of Onset     Cancer Father      Depression Sister      Ear Disorder No family hx of     Social History     Social History      Marital status:      Spouse name: N/A     Number of children: 4     Years of education: N/A     Occupational History      None      Social History Main Topics     Smoking status: Former Smoker     Packs/day: 1.00     Years: 10.00     Types: Cigarettes     Start date: 6/15/1973     Quit date: 1/1/1985     Smokeless tobacco: Never Used     Alcohol use No     Drug use: No     Sexual activity: Yes     Partners: Male     Birth control/ protection: None      Comment: hysterectomy     Other Topics Concern     Not on file     Social History Narrative       REVIEW OF SYSTEMS: Patient Supplied Answers to Review of Systems   ENT ROS 10/2/2018   Constitutional -   Neurology Headache   Psychology -   Ears, Nose, Throat Ear pain   Musculoskeletal Neck pain   Hematologic Easy bruising   Endocrine -       The remainder of the 10 point ROS is negative    PHYSICIAL EXAMINATION:  Constitutional: The patient was well-groomed and in no acute distress.   Skin: Warm and pink.  Psychiatric: The patient's affect was calm, cooperative, and appropriate.   Respiratory: Breathing comfortably without stridor or exertion of accessory muscles.  Eyes: Pupils were equal and reactive. Extraocular movement intact.   Head: Normocephalic and atraumatic. No lesions or scars.  Ears: Both ears examined she does have some crusting on the left TM.  She was placed supine on the microscope and under high-power magnification, using a right angled hook, the crusting was mobilized and removed.  It shows the old injection site to have healed and TM is intact.  Air-filled middle ear space.  The right side was examined and cleaned of mild cerumen with curettes.  Following cleaning, TM and middle ear looked normal.  Nose: Sinuses were nontender. Anterior rhinoscopy revealed midline septum and absence of purulence or polyps.  Oral Cavity: Normal tongue, floor of mouth, buccal mucosa, and palate. No abnormal lymph tissue in the oropharynx.   Neck: The parotid  is soft without masses. Supple with normal laryngeal and tracheal landmarks.   Lymphatic: There is no palpable lymphadenopathy or other masses in the neck.   Neurologic: Alert and oriented x 3. Cranial nerves III-XI within normal limits. Voice quality normal.  Cerebellar Function Tests:  Grossly normal    Audiogram: Audiogram performed shows sensory normal hearing in both ears through all frequencies.  She has 100% discrimination bilaterally.    IMPRESSION AND PLAN:   1. Left Ménière's: Clinically stable, recommend continue with 3 Ménière's medications.  Monitor with follow-up in audio in 6 months, sooner with any return of symptoms or problems.  2. Left TM debris: Clean today under microscope with old injection/perforation healed.  No further treatment at this time.    Recommend follow-up in 6 months with audio to reassess, she is come in sooner with any concerns or problems.    Thank you very much for the opportunity to participate in the care of your patient.    Rick L Nissen MD

## 2018-10-02 NOTE — LETTER
Date:October 3, 2018      Patient was self referred, no letter generated. Do not send.        [facility] Information

## 2018-10-22 ENCOUNTER — TELEPHONE (OUTPATIENT)
Dept: OTOLARYNGOLOGY | Facility: CLINIC | Age: 63
End: 2018-10-22

## 2018-10-23 ENCOUNTER — OFFICE VISIT (OUTPATIENT)
Dept: OTOLARYNGOLOGY | Facility: CLINIC | Age: 63
End: 2018-10-23
Payer: COMMERCIAL

## 2018-10-23 ENCOUNTER — OFFICE VISIT (OUTPATIENT)
Dept: AUDIOLOGY | Facility: CLINIC | Age: 63
End: 2018-10-23
Payer: COMMERCIAL

## 2018-10-23 DIAGNOSIS — H81.02 MENIERE'S DISEASE, LEFT: Primary | ICD-10-CM

## 2018-10-23 DIAGNOSIS — R42 DIZZINESS AND GIDDINESS: Primary | ICD-10-CM

## 2018-10-23 DIAGNOSIS — H90.3 ASYMMETRICAL SENSORINEURAL HEARING LOSS: ICD-10-CM

## 2018-10-23 DIAGNOSIS — H93.12 TINNITUS, LEFT: ICD-10-CM

## 2018-10-23 DIAGNOSIS — H91.90 HEARING LOSS: Primary | ICD-10-CM

## 2018-10-23 DIAGNOSIS — H93.8X2 EAR PRESSURE, LEFT: ICD-10-CM

## 2018-10-23 DIAGNOSIS — H61.22 CERUMEN DEBRIS ON TYMPANIC MEMBRANE OF LEFT EAR: ICD-10-CM

## 2018-10-23 ASSESSMENT — PAIN SCALES - GENERAL: PAINLEVEL: NO PAIN (0)

## 2018-10-23 NOTE — NURSING NOTE
Chief Complaint   Patient presents with     RECHECK     meniere's disease        Smooth Brewster LPN

## 2018-10-23 NOTE — LETTER
10/23/2018       RE: Madelin Story  [address] [place] [address]     Dear Colleague,    Thank you for referring your patient, Madelin Story, to the [facility] EAR NOSE AND THROAT at [facility] Please see a copy of my visit note below.    Dear Dr. Soliz primary care provider on file.:    I had the pleasure of seeing Madelin Story in followup today at the [facility].    CHIEF COMPLAINT: Left Ménière's    HISTORY OF PRESENT ILLNESS: Patient is a 62-year-old in today for follow-up from her last visit 10/2/18.  At that time, her left ear Ménière symptoms seem stable and we were going to monitor.  She had had 2 previous left transtympanic steroid injections for her left Ménière's.  She was having episodic vertigo along with left fluctuating hearing loss and left tinnitus.  Her symptoms were markedly improved at the last visit and we were going to monitor.  She has been on the 3 Ménière's medications as well.  On her follow-up today, he says the past week the left tinnitus has gotten much louder and she questions whether the left hearing is fluctuating and worse.  She has had 2 episodes of spinning that were brief but present.  They were not the severe spinning episodes, but definitely had the spinning sensation return.  She did have an MRI scan this past July which was negative.  She was having up to 5-7 episodes per month of the severe spinning vertigo along with the fluctuating auditory symptoms in the left ear.  At her last visit the symptoms had all been absent for several weeks.    MEDICATIONS: Please refer to the detailed medication reconciliation performed by my nurse today, which I have reviewed and signed.     ALLERGIES:    Allergies   Allergen Reactions     Penicillin [Penicillins] Swelling     Sulfa Drugs Nausea and Vomiting     Cefuroxime Nausea and Vomiting     PN: Vomiting     Clindamycin Rash     Gadolinium Derivatives Itching      PN: Just itching. Gadavist given on 7/9/14 for MR Brain WWO exam.        HABITS:   Alcohol use No    History   Smoking Status     Former Smoker     Packs/day: 1.00     Years: 10.00     Types: Cigarettes     Start date: 6/15/1973     Quit date: 1/1/1985   Smokeless Tobacco     Never Used         PAST MEDICAL HISTORY:  Please see today's intake form (for the remainder of the PMH) which I reviewed and signed.  Past Medical History:   Diagnosis Date     Anxiety      Arthritis      Hearing problem      Meniere's disease      Osteoporosis      Tinnitus        FAMILY HISTORY/SOCIAL HISTORY:    Family History   Problem Relation Age of Onset     Cancer Father      Depression Sister      Ear Disorder No family hx of     Social History     Social History     Marital status:      Spouse name: N/A     Number of children: 4     Years of education: N/A     Occupational History      None      Social History Main Topics     Smoking status: Former Smoker     Packs/day: 1.00     Years: 10.00     Types: Cigarettes     Start date: 6/15/1973     Quit date: 1/1/1985     Smokeless tobacco: Never Used     Alcohol use No     Drug use: No     Sexual activity: Yes     Partners: Male     Birth control/ protection: None      Comment: hysterectomy     Other Topics Concern     Not on file     Social History Narrative       REVIEW OF SYSTEMS: Patient Supplied Answers to Review of Systems   ENT ROS 10/23/2018   Constitutional -   Neurology Dizzy spells, Headache   Psychology Frequently feeling anxious   Ears, Nose, Throat Ear pain, Ringing/noise in ears, Nasal congestion or drainage   Cardiopulmonary Cough   Musculoskeletal Sore or stiff joints   Hematologic Easy bruising   Endocrine -       The remainder of the 10 point ROS is negative    PHYSICIAL EXAMINATION:  Constitutional: The patient was well-groomed and in no acute distress.   Skin: Warm and pink.  Psychiatric: The patient's affect was calm, cooperative, and appropriate.    Respiratory: Breathing comfortably without stridor or exertion of accessory muscles.  Eyes: Pupils were equal and reactive. Extraocular movement intact.   Head: Normocephalic and atraumatic. No lesions or scars.  Ears: Both ears examined and she does have debris on the left TM next to the old injection site.  She is placed under the microscope and under high-power magnification this area was cleaned with suction and hook.  Following cleaning, the old injection site again looks to have healed nicely.  TM is in good position with an air-filled middle ear space.  The right ear was cleaned and examined using microscope, curette, and similar techniques.  TM and middle ear normal after cleaning.  Nose: Sinuses were nontender. Anterior rhinoscopy revealed midline septum and absence of purulence or polyps.  Oral Cavity: Normal tongue, floor of mouth, buccal mucosa, and palate. No abnormal lymph tissue in the oropharynx.   Neck: The parotid is soft without masses. Supple with normal laryngeal and tracheal landmarks.   Lymphatic: There is no palpable lymphadenopathy or other masses in the neck.   Neurologic: Alert and oriented x 3. Cranial nerves III-XI within normal limits. Voice quality normal.  Cerebellar Function Tests:  Grossly normal    Audiogram: Audiogram performed shows a mild low-frequency sensorineural hearing loss in both ears with about a 10 dB increased hearing loss on the left side.  She maintains 100% discrimination in each ear.    IMPRESSION AND PLAN:   1. Left Ménière's: She certainly has symptoms very suggestive for Ménière's in the left ear.  She has had 2 bouts of brief but definite spinning sensation, associated with increased left tinnitus and ear pressure, also the question of increased left hearing loss.  Discussed Ménière's again with her in detail.  Recommend she continue with the 3 Ménière's medications.  We talked about proceeding with another left transtympanic steroid injection as she  responded well to this in the past and now has again developed mild but definite left symptoms.  Risk of this discussed with persistent perforation, drainage, continued symptoms, unforeseen complications, etc.  She desires to proceed and injection completed without incident.  2. Left asymmetrical sensorineural hearing loss: Feel secondary to left Ménière's, treatment as above, monitor.  3. Left tinnitus: Secondary to left Ménière's, treatment as above, monitor.  4. Left ear pressure: Treatment as above, monitor.  5. Left ear canal debris: Cleaned with microscope and instruments, no further treatment needed, monitor.    PROCEDURE NOTE:  Pt placed supine under the microscope for  transtympanic steroid injection. The  ear was examined. A drop of phenol was placed in the posterior superior quadrant. Dexamethasone in a solution of 24 mg/ml was injected into the left middle ear using a 25 gauge needle. Total injection of 1 ml.       Pt remained supine for 20 minutes and was released to their own care.    Recommended she follow-up in 2 weeks with audiogram to reassess and proceed accordingly.    Thank you very much for the opportunity to participate in the care of your patient.    Rick L Nissen MD            Again, thank you for allowing me to participate in the care of your patient.      Sincerely,    Rick L. Nissen, MD

## 2018-10-23 NOTE — LETTER
Date:October 24, 2018      Patient was self referred, no letter generated. Do not send.        [facility] Health Information

## 2018-10-23 NOTE — PATIENT INSTRUCTIONS
1.  You were seen in the ENT Clinic today by Dr. Nissen.  If you have any questions or concerns after your appointment, please call [phone]. Press option #1 for scheduling related needs. Press option #3 for Nurse advice.  2.  Plan is to return to clinic in 3 weeks with an audiogram prior to appointment.    Viky Olmstead LPN  [facility]

## 2018-10-23 NOTE — MR AVS SNAPSHOT
After Visit Summary   10/23/2018    Madelin Story    MRN: [id number]           Patient Information     Date Of Birth          1955        Visit Information        Provider Department      10/23/2018 3:30 PM Nissen, Rick L, MD [facility]        Today's Diagnoses     Meniere's disease, left    -  1      Care Instructions    1.  You were seen in the ENT Clinic today by Dr. Nissen.  If you have any questions or concerns after your appointment, please call [phone]. Press option #1 for scheduling related needs. Press option #3 for Nurse advice.  2.  Plan is to return to clinic in 3 weeks with an audiogram prior to appointment.    Viky Olmstead LPN  [facility]            Follow-ups after your visit        Follow-up notes from your care team     Return in about 3 weeks (around 11/13/2018).      Future tests that were ordered for you today     Open Future Orders        Priority Expected Expires Ordered    AUDIOLOGY ADULT REFERRAL Routine  4/21/2019 10/23/2018            Who to contact     Please call your clinic at [phone] to:    Ask questions about your health    Make or cancel appointments    Discuss your medicines    Learn about your test results    Speak to your doctor            Additional Information About Your Visit        Care EveryWhere ID     This is your Care EveryWhere ID. This could be used by other organizations to access your [facility] medical records  [id number]         Blood Pressure from Last 3 Encounters:   10/24/08 120/78   06/25/07 110/70   01/12/07 130/80    Weight from Last 3 Encounters:   10/02/18 55.3 kg (122 lb)   08/07/18 55.3 kg (122 lb)   10/24/08 58.5 kg (129 lb)              Today, you had the following     No orders found for display       Primary Care Provider    None Specified       No primary provider on file.        Equal Access to Services     ELEUTERIO LOPEZ : piero Pace qaybta kaalmada  savita yatesdorcas renteriaaan ah. So [facility] [phone].    ATENCIÓN: Si [facility], tiene a giron disposición servicios gratuitos de asistencia lingüística. Rey al [phone].    We comply with applicable federal civil rights laws and [place] laws. We do not discriminate on the basis of race, color, national origin, age, disability, sex, sexual orientation, or gender identity.            Thank you!     Thank you for choosing [facility] EAR NOSE AND THROAT  for your care. Our goal is always to provide you with excellent care. Hearing back from our patients is one way we can continue to improve our services. Please take a few minutes to complete the written survey that you may receive in the mail after your visit with us. Thank you!             Your Updated Medication List - Protect others around you: Learn how to safely use, store and throw away your medicines at www.disposemymeds.org.          This list is accurate as of 10/23/18  3:34 PM.  Always use your most recent med list.                   Brand Name Dispense Instructions for use Diagnosis    amLODIPine 5 MG tablet    NORVASC     Take 5 mg by mouth        clonazePAM 0.5 MG tablet    klonoPIN     Take 0.5 mg by mouth        dexamethasone 24 MG/ML injection    DECADRON    1 mL    1 mL (24 mg) by INTRATYMPANIC route once    Meniere's disease, left       estradiol 10 MCG Tabs vaginal tablet    VAGIFEM     Place 10 mcg vaginally        hydrochlorothiazide 25 MG tablet    HYDRODIURIL     Take 25 mg by mouth        ibuprofen 200 MG tablet    ADVIL/MOTRIN    120    2-3  hs        lidocaine 5 % Patch    LIDODERM     PLACE 1 PATCH ONTO THE SKIN EVERY 24 HOURS. (ON 12 AND OFF FOR 12 HRS)        pregabalin 75 MG capsule    LYRICA     Take 75 mg by mouth        traMADol 50 MG tablet    ULTRAM     Take 50 mg by mouth        traZODone 50 MG tablet    DESYREL    3 MONTHS    1 1/2 Q HS    Localized osteoarthrosis not specified whether primary or  secondary, upper arm, Sleep disturbance, unspecified       venlafaxine 225 MG Tb24 24 hr tablet    EFFEXOR-ER     Take 225 mg by mouth

## 2018-10-23 NOTE — NURSING NOTE
Invasive Procedure Safety Checklist  Procedure:  Left Dexamethasone Injection    Responsible person(s):  Complete sections as appropriate and electronically sign and date below.    Staff/Provider  Consent documentation on chart:  YES  H&P is not applicable (when straight local anesthesia is used).    Procedure Team  Completed by comparing informed consent documentation, information on the patient record and/or the marked surgical site, and discussion with the patient/guardian.     Verified:  (Select all that apply)  Patient identification (two indicators)  Procedure to be performed  Procedure site and /or laterality and/or level  Consent  Procedure site:  Site can not be marked due to location.  Provider Lainez - Site/Laterality/Level:  Left  Staff/Provider:  No images    Procedure Team:  *Pause for the Cause* verbal and active participation of team members- verify:  Patient name:  YES  Procedure to be performed:  YES  Site, laterality and level, noting patient position:  YES    Above steps completed as applicable (Electronic Signature, Title, Date):    Viky Olmstead LPN      Note:  Any incidents of wrong patient, wrong procedure, or wrong site are reported using the Occurrence Process already in place.  The occurrence form is required to be completed immediately with this type of event.

## 2018-10-24 NOTE — PROGRESS NOTES
Dear Dr. Soliz primary care provider on file.:    I had the pleasure of seeing Madelin Story in followup today at the [facility].    CHIEF COMPLAINT: Left Ménière's    HISTORY OF PRESENT ILLNESS: Patient is a 62-year-old in today for follow-up from her last visit 10/2/18.  At that time, her left ear Ménière symptoms seem stable and we were going to monitor.  She had had 2 previous left transtympanic steroid injections for her left Ménière's.  She was having episodic vertigo along with left fluctuating hearing loss and left tinnitus.  Her symptoms were markedly improved at the last visit and we were going to monitor.  She has been on the 3 Ménière's medications as well.  On her follow-up today, he says the past week the left tinnitus has gotten much louder and she questions whether the left hearing is fluctuating and worse.  She has had 2 episodes of spinning that were brief but present.  They were not the severe spinning episodes, but definitely had the spinning sensation return.  She did have an MRI scan this past July which was negative.  She was having up to 5-7 episodes per month of the severe spinning vertigo along with the fluctuating auditory symptoms in the left ear.  At her last visit the symptoms had all been absent for several weeks.    MEDICATIONS: Please refer to the detailed medication reconciliation performed by my nurse today, which I have reviewed and signed.     ALLERGIES:    Allergies   Allergen Reactions     Penicillin [Penicillins] Swelling     Sulfa Drugs Nausea and Vomiting     Cefuroxime Nausea and Vomiting     PN: Vomiting     Clindamycin Rash     Gadolinium Derivatives Itching     PN: Just itching. Gadavist given on 7/9/14 for MR Brain WWO exam.        HABITS:   Alcohol use No    History   Smoking Status     Former Smoker     Packs/day: 1.00     Years: 10.00     Types: Cigarettes     Start date: 6/15/1973     Quit date: 1/1/1985   Smokeless Tobacco     Never  Used         PAST MEDICAL HISTORY:  Please see today's intake form (for the remainder of the PMH) which I reviewed and signed.  Past Medical History:   Diagnosis Date     Anxiety      Arthritis      Hearing problem      Meniere's disease      Osteoporosis      Tinnitus        FAMILY HISTORY/SOCIAL HISTORY:    Family History   Problem Relation Age of Onset     Cancer Father      Depression Sister      Ear Disorder No family hx of     Social History     Social History     Marital status:      Spouse name: N/A     Number of children: 4     Years of education: N/A     Occupational History      None      Social History Main Topics     Smoking status: Former Smoker     Packs/day: 1.00     Years: 10.00     Types: Cigarettes     Start date: 6/15/1973     Quit date: 1/1/1985     Smokeless tobacco: Never Used     Alcohol use No     Drug use: No     Sexual activity: Yes     Partners: Male     Birth control/ protection: None      Comment: hysterectomy     Other Topics Concern     Not on file     Social History Narrative       REVIEW OF SYSTEMS: Patient Supplied Answers to Review of Systems   ENT ROS 10/23/2018   Constitutional -   Neurology Dizzy spells, Headache   Psychology Frequently feeling anxious   Ears, Nose, Throat Ear pain, Ringing/noise in ears, Nasal congestion or drainage   Cardiopulmonary Cough   Musculoskeletal Sore or stiff joints   Hematologic Easy bruising   Endocrine -       The remainder of the 10 point ROS is negative    PHYSICIAL EXAMINATION:  Constitutional: The patient was well-groomed and in no acute distress.   Skin: Warm and pink.  Psychiatric: The patient's affect was calm, cooperative, and appropriate.   Respiratory: Breathing comfortably without stridor or exertion of accessory muscles.  Eyes: Pupils were equal and reactive. Extraocular movement intact.   Head: Normocephalic and atraumatic. No lesions or scars.  Ears: Both ears examined and she does have debris on the left TM next to the  old injection site.  She is placed under the microscope and under high-power magnification this area was cleaned with suction and hook.  Following cleaning, the old injection site again looks to have healed nicely.  TM is in good position with an air-filled middle ear space.  The right ear was cleaned and examined using microscope, curette, and similar techniques.  TM and middle ear normal after cleaning.  Nose: Sinuses were nontender. Anterior rhinoscopy revealed midline septum and absence of purulence or polyps.  Oral Cavity: Normal tongue, floor of mouth, buccal mucosa, and palate. No abnormal lymph tissue in the oropharynx.   Neck: The parotid is soft without masses. Supple with normal laryngeal and tracheal landmarks.   Lymphatic: There is no palpable lymphadenopathy or other masses in the neck.   Neurologic: Alert and oriented x 3. Cranial nerves III-XI within normal limits. Voice quality normal.  Cerebellar Function Tests:  Grossly normal    Audiogram: Audiogram performed shows a mild low-frequency sensorineural hearing loss in both ears with about a 10 dB increased hearing loss on the left side.  She maintains 100% discrimination in each ear.    IMPRESSION AND PLAN:   1. Left Ménière's: She certainly has symptoms very suggestive for Ménière's in the left ear.  She has had 2 bouts of brief but definite spinning sensation, associated with increased left tinnitus and ear pressure, also the question of increased left hearing loss.  Discussed Ménière's again with her in detail.  Recommend she continue with the 3 Ménière's medications.  We talked about proceeding with another left transtympanic steroid injection as she responded well to this in the past and now has again developed mild but definite left symptoms.  Risk of this discussed with persistent perforation, drainage, continued symptoms, unforeseen complications, etc.  She desires to proceed and injection completed without incident.  2. Left asymmetrical  sensorineural hearing loss: Feel secondary to left Ménière's, treatment as above, monitor.  3. Left tinnitus: Secondary to left Ménière's, treatment as above, monitor.  4. Left ear pressure: Treatment as above, monitor.  5. Left ear canal debris: Cleaned with microscope and instruments, no further treatment needed, monitor.    PROCEDURE NOTE:  Pt placed supine under the microscope for  transtympanic steroid injection. The  ear was examined. A drop of phenol was placed in the posterior superior quadrant. Dexamethasone in a solution of 24 mg/ml was injected into the left middle ear using a 25 gauge needle. Total injection of 1 ml.       Pt remained supine for 20 minutes and was released to their own care.    Recommended she follow-up in 2 weeks with audiogram to reassess and proceed accordingly.    Thank you very much for the opportunity to participate in the care of your patient.    Rick L Nissen MD

## 2018-10-24 NOTE — PROGRESS NOTES
AUDIOLOGY REPORT    SUMMARY: Audiology visit completed. See audiogram for results.      RECOMMENDATIONS: Follow-up with ENT.      Neha Jones.  Licensed Audiologist  [place] #[id number]

## 2018-11-12 DIAGNOSIS — H91.90 HEARING LOSS, UNSPECIFIED HEARING LOSS TYPE, UNSPECIFIED LATERALITY: Primary | ICD-10-CM

## 2018-12-10 DIAGNOSIS — H91.90 HEARING LOSS, UNSPECIFIED HEARING LOSS TYPE, UNSPECIFIED LATERALITY: Primary | ICD-10-CM

## 2018-12-11 ENCOUNTER — OFFICE VISIT (OUTPATIENT)
Dept: AUDIOLOGY | Facility: CLINIC | Age: 63
End: 2018-12-11
Attending: OTOLARYNGOLOGY
Payer: COMMERCIAL

## 2018-12-11 ENCOUNTER — OFFICE VISIT (OUTPATIENT)
Dept: OTOLARYNGOLOGY | Facility: CLINIC | Age: 63
End: 2018-12-11
Payer: COMMERCIAL

## 2018-12-11 VITALS — HEIGHT: 63 IN | WEIGHT: 120 LBS | BODY MASS INDEX: 21.26 KG/M2

## 2018-12-11 DIAGNOSIS — H93.12 TINNITUS, LEFT: ICD-10-CM

## 2018-12-11 DIAGNOSIS — H81.02 MENIERE'S DISEASE, LEFT: Primary | ICD-10-CM

## 2018-12-11 DIAGNOSIS — R42 DIZZINESS: ICD-10-CM

## 2018-12-11 DIAGNOSIS — H90.42 SENSORINEURAL HEARING LOSS (SNHL) OF LEFT EAR WITH UNRESTRICTED HEARING OF RIGHT EAR: Primary | ICD-10-CM

## 2018-12-11 DIAGNOSIS — H61.22 CERUMEN DEBRIS ON TYMPANIC MEMBRANE OF LEFT EAR: ICD-10-CM

## 2018-12-11 DIAGNOSIS — H93.8X2 SENSATION OF FULLNESS IN LEFT EAR: ICD-10-CM

## 2018-12-11 DIAGNOSIS — H93.12 TINNITUS OF LEFT EAR: ICD-10-CM

## 2018-12-11 DIAGNOSIS — H91.90 HEARING LOSS, UNSPECIFIED HEARING LOSS TYPE, UNSPECIFIED LATERALITY: ICD-10-CM

## 2018-12-11 ASSESSMENT — PAIN SCALES - GENERAL: PAINLEVEL: NO PAIN (0)

## 2018-12-11 ASSESSMENT — MIFFLIN-ST. JEOR: SCORE: 1068.45

## 2018-12-11 NOTE — NURSING NOTE
Prior to injection, verified patient identity using patient's name and date of birth.  Due to injection administration, patient instructed to remain in clinic for 15 minutes  afterwards, and to report any adverse reaction to me immediately.    Intratympanic Dexamethasone Injection    Drug Amount Wasted:  None.  Vial/Syringe: Syringe

## 2018-12-11 NOTE — PATIENT INSTRUCTIONS
1.  You were seen in the ENT Clinic today by Dr. Nissen.  If you have any questions or concerns after your appointment, please call [phone]. Press option #1 for scheduling related needs. Press option #3 for Nurse advice.  2.  Plan is to return to clinic in March with an audiogram prior to appointment.    Viky Olmstead LPN  [facility] ENT

## 2018-12-11 NOTE — PROGRESS NOTES
AUDIOLOGY REPORT    SUMMARY: Audiology visit completed. See audiogram for results.      RECOMMENDATIONS: Follow-up with ENT.      Chun Ocasio  Audiologist  [place] License  #[id number]

## 2018-12-11 NOTE — NURSING NOTE
Invasive Procedure Safety Checklist  Procedure:  Left Intratympanic Dexamethasone Injection    Responsible person(s):  Complete sections as appropriate and electronically sign and date below.    Staff/Provider  Consent documentation on chart:  YES  H&P is not applicable (when straight local anesthesia is used).    Procedure Team  Completed by comparing informed consent documentation, information on the patient record and/or the marked surgical site, and discussion with the patient/guardian.     Verified:  (Select all that apply)  Patient identification (two indicators)  Procedure to be performed  Procedure site and /or laterality and/or level  Consent  Procedure site:  Site can not be marked due to location.  Provider Lainez - Site/Laterality/Level:  Left  Staff/Provider:  No images    Procedure Team:  *Pause for the Cause* verbal and active participation of team members- verify:  Patient name:  YES  Procedure to be performed:  YES  Site, laterality and level, noting patient position:  YES    Above steps completed as applicable (Electronic Signature, Title, Date):    Viky Olmstead LPN      Note:  Any incidents of wrong patient, wrong procedure, or wrong site are reported using the Occurrence Process already in place.  The occurrence form is required to be completed immediately with this type of event.

## 2018-12-11 NOTE — LETTER
12/11/2018       RE: Madelin Story  [address] [place] [place] [address]     Dear Colleague,    Thank you for referring your patient, Madelin Story, to the [facility] at [facility]. Please see a copy of my visit note below.         Dear Dr. Soliz primary care provider on file.:    I had the pleasure of seeing Madelin Story in followup today at the [facility].    CHIEF COMPLAINT: Left Ménière's    HISTORY OF PRESENT ILLNESS: Patient is a 63-year-old in today for follow-up from her last visit October 23.  She underwent a left transtympanic steroid injection at that time.  She had 2 previous injections in August and was doing better.  With return of symptoms at her last visit another injection was performed, she is in for follow-up now.  She says she has had one episode since her last visit.  That consisted of spinning along with increased left tinnitus.  She feels her hearing has been fairly stable.  She began having symptoms in July with episodic vertigo and typical symptoms of increased left hearing loss, left tinnitus and left ear pressure.  She had been having problems intermittently since 2014 with increased problems she came in in July for initial assessment.  She did have an MRI scan in July which was normal.  She has been on 3 Ménière's medications.    MEDICATIONS: Please refer to the detailed medication reconciliation performed by my nurse today, which I have reviewed and signed.     ALLERGIES:    Allergies   Allergen Reactions     Penicillin [Penicillins] Swelling     Sulfa Drugs Nausea and Vomiting     Cefuroxime Nausea and Vomiting     PN: Vomiting     Clindamycin Rash     Gadolinium Derivatives Itching     PN: Just itching. Gadavist given on 7/9/14 for MR Brain WWO exam.        HABITS: Social History    Substance and Sexual Activity      Alcohol use: No     History   Smoking Status     Former Smoker     Packs/day: 1.00      Years: 10.00     Types: Cigarettes     Start date: 6/15/1973     Quit date: 1985   Smokeless Tobacco     Never Used         PAST MEDICAL HISTORY:  Please see today's intake form (for the remainder of the PMH) which I reviewed and signed.  Past Medical History:   Diagnosis Date     Anxiety      Arthritis      Hearing problem      Meniere's disease      Osteoporosis      Tinnitus        FAMILY HISTORY/SOCIAL HISTORY:    Family History   Problem Relation Age of Onset     Cancer Father      Depression Sister      Ear Disorder No family hx of       Social History     Socioeconomic History     Marital status:      Spouse name: Not on file     Number of children: 4     Years of education: Not on file     Highest education level: Not on file   Social Needs     Financial resource strain: Not on file     Food insecurity - worry: Not on file     Food insecurity - inability: Not on file     Transportation needs - medical: Not on file     Transportation needs - non-medical: Not on file   Occupational History     Employer: NONE    Tobacco Use     Smoking status: Former Smoker     Packs/day: 1.00     Years: 10.00     Pack years: 10.00     Types: Cigarettes     Start date: 6/15/1973     Last attempt to quit: 1985     Years since quittin.9     Smokeless tobacco: Never Used   Substance and Sexual Activity     Alcohol use: No     Drug use: No     Sexual activity: Yes     Partners: Male     Birth control/protection: None     Comment: hysterectomy   Other Topics Concern     Not on file   Social History Narrative     Not on file       REVIEW OF SYSTEMS: Patient Supplied Answers to Review of Systems   ENT ROS 2018   Constitutional -   Neurology Headache   Psychology -   Ears, Nose, Throat Ringing/noise in ears   Cardiopulmonary -   Musculoskeletal Neck pain   Hematologic Easy bruising   Endocrine Heat or cold intolerance       The remainder of the 10 point ROS is negative    PHYSICIAL  EXAMINATION:  Constitutional: The patient was well-groomed and in no acute distress.   Skin: Warm and pink.  Psychiatric: The patient's affect was calm, cooperative, and appropriate.   Respiratory: Breathing comfortably without stridor or exertion of accessory muscles.  Eyes: Pupils were equal and reactive. Extraocular movement intact.   Head: Normocephalic and atraumatic. No lesions or scars.  Ears: Ears are examined and she has debris on the left TM.  She was placed on the microscope and under high-power magnification the debris was mobilized with a right angled hook, removed with an alligator.  It could be seen the old injection site has healed.  Middle ear looks to be air-filled.  Right side is examined and cleaned of mild cerumen with curette.  Following cleaning, TM and middle ear looked normal.  Nose: Sinuses were nontender. Anterior rhinoscopy revealed midline septum and absence of purulence or polyps.  Oral Cavity: Normal tongue, floor of mouth, buccal mucosa, and palate. No abnormal lymph tissue in the oropharynx.   Neck: The parotid is soft without masses. Supple with normal laryngeal and tracheal landmarks.   Lymphatic: There is no palpable lymphadenopathy or other masses in the neck.   Neurologic: Alert and oriented x 3. Cranial nerves III-XI within normal limits. Voice quality normal.  Cerebellar Function Tests:  Grossly normal    Audiogram: Audiogram shows normal hearing in both ears through all frequencies.  Left ear shows about a 5-10 dB increased hearing loss but still within normal range.  100% discrimination bilaterally.    IMPRESSION AND PLAN:   1. Left Ménière's: Symptoms significantly improved, still has had one episode of vertigo since last visit.  We discussed one further injection which she prefers to proceed with as she has benefited from those in the past.  Risk of this discussed with perforation, drainage, unforeseen complications such as continued symptoms.  She desires to proceed and  injection completed without incident.  2. Left tinnitus: Secondary to Ménière's, treatment as above, monitor.    PROCEDURE NOTE:  Pt placed supine under the microscope for left transtympanic steroid injection. The left ear was examined. A drop of phenol was placed in the posterior superior quadrant. Dexamethasone in a solution of 24 mg/ml was injected into the left middle ear using a 25 gauge needle. Total injection of 1 ml.  Pt remained supine for 20 minutes and was released to their own care.    We will monitor for now and patient will follow-up in 2 months, sooner with any return of symptoms or problems.  To continue with medications for her Ménière's.      Thank you very much for the opportunity to participate in the care of your patient.    Rick L Nissen MD            Again, thank you for allowing me to participate in the care of your patient.      Sincerely,    Rick L. Nissen, MD

## 2018-12-11 NOTE — LETTER
Date:December 12, 2018      Patient was self referred, no letter generated. Do not send.        [facility] Health Information

## 2018-12-12 NOTE — PROGRESS NOTES
Dear Dr. Soliz primary care provider on file.:    I had the pleasure of seeing Madelin Story in followup today at the [facility].    CHIEF COMPLAINT: Left Ménière's    HISTORY OF PRESENT ILLNESS: Patient is a 63-year-old in today for follow-up from her last visit October 23.  She underwent a left transtympanic steroid injection at that time.  She had 2 previous injections in August and was doing better.  With return of symptoms at her last visit another injection was performed, she is in for follow-up now.  She says she has had one episode since her last visit.  That consisted of spinning along with increased left tinnitus.  She feels her hearing has been fairly stable.  She began having symptoms in July with episodic vertigo and typical symptoms of increased left hearing loss, left tinnitus and left ear pressure.  She had been having problems intermittently since 2014 with increased problems she came in in July for initial assessment.  She did have an MRI scan in July which was normal.  She has been on 3 Ménière's medications.    MEDICATIONS: Please refer to the detailed medication reconciliation performed by my nurse today, which I have reviewed and signed.     ALLERGIES:    Allergies   Allergen Reactions     Penicillin [Penicillins] Swelling     Sulfa Drugs Nausea and Vomiting     Cefuroxime Nausea and Vomiting     PN: Vomiting     Clindamycin Rash     Gadolinium Derivatives Itching     PN: Just itching. Gadavist given on 7/9/14 for MR Brain WWO exam.        HABITS: Social History    Substance and Sexual Activity      Alcohol use: No     History   Smoking Status     Former Smoker     Packs/day: 1.00     Years: 10.00     Types: Cigarettes     Start date: 6/15/1973     Quit date: 1/1/1985   Smokeless Tobacco     Never Used         PAST MEDICAL HISTORY:  Please see today's intake form (for the remainder of the PMH) which I reviewed and signed.  Past Medical History:   Diagnosis Date      Anxiety      Arthritis      Hearing problem      Meniere's disease      Osteoporosis      Tinnitus        FAMILY HISTORY/SOCIAL HISTORY:    Family History   Problem Relation Age of Onset     Cancer Father      Depression Sister      Ear Disorder No family hx of       Social History     Socioeconomic History     Marital status:      Spouse name: Not on file     Number of children: 4     Years of education: Not on file     Highest education level: Not on file   Social Needs     Financial resource strain: Not on file     Food insecurity - worry: Not on file     Food insecurity - inability: Not on file     Transportation needs - medical: Not on file     Transportation needs - non-medical: Not on file   Occupational History     Employer: NONE    Tobacco Use     Smoking status: Former Smoker     Packs/day: 1.00     Years: 10.00     Pack years: 10.00     Types: Cigarettes     Start date: 6/15/1973     Last attempt to quit: 1985     Years since quittin.9     Smokeless tobacco: Never Used   Substance and Sexual Activity     Alcohol use: No     Drug use: No     Sexual activity: Yes     Partners: Male     Birth control/protection: None     Comment: hysterectomy   Other Topics Concern     Not on file   Social History Narrative     Not on file       REVIEW OF SYSTEMS: Patient Supplied Answers to Review of Systems   ENT ROS 2018   Constitutional -   Neurology Headache   Psychology -   Ears, Nose, Throat Ringing/noise in ears   Cardiopulmonary -   Musculoskeletal Neck pain   Hematologic Easy bruising   Endocrine Heat or cold intolerance       The remainder of the 10 point ROS is negative    PHYSICIAL EXAMINATION:  Constitutional: The patient was well-groomed and in no acute distress.   Skin: Warm and pink.  Psychiatric: The patient's affect was calm, cooperative, and appropriate.   Respiratory: Breathing comfortably without stridor or exertion of accessory muscles.  Eyes: Pupils were equal and reactive.  Extraocular movement intact.   Head: Normocephalic and atraumatic. No lesions or scars.  Ears: Ears are examined and she has debris on the left TM.  She was placed on the microscope and under high-power magnification the debris was mobilized with a right angled hook, removed with an alligator.  It could be seen the old injection site has healed.  Middle ear looks to be air-filled.  Right side is examined and cleaned of mild cerumen with curette.  Following cleaning, TM and middle ear looked normal.  Nose: Sinuses were nontender. Anterior rhinoscopy revealed midline septum and absence of purulence or polyps.  Oral Cavity: Normal tongue, floor of mouth, buccal mucosa, and palate. No abnormal lymph tissue in the oropharynx.   Neck: The parotid is soft without masses. Supple with normal laryngeal and tracheal landmarks.   Lymphatic: There is no palpable lymphadenopathy or other masses in the neck.   Neurologic: Alert and oriented x 3. Cranial nerves III-XI within normal limits. Voice quality normal.  Cerebellar Function Tests:  Grossly normal    Audiogram: Audiogram shows normal hearing in both ears through all frequencies.  Left ear shows about a 5-10 dB increased hearing loss but still within normal range.  100% discrimination bilaterally.    IMPRESSION AND PLAN:   1. Left Ménière's: Symptoms significantly improved, still has had one episode of vertigo since last visit.  We discussed one further injection which she prefers to proceed with as she has benefited from those in the past.  Risk of this discussed with perforation, drainage, unforeseen complications such as continued symptoms.  She desires to proceed and injection completed without incident.  2. Left tinnitus: Secondary to Ménière's, treatment as above, monitor.    PROCEDURE NOTE:  Pt placed supine under the microscope for left transtympanic steroid injection. The left ear was examined. A drop of phenol was placed in the posterior superior quadrant.  Dexamethasone in a solution of 24 mg/ml was injected into the left middle ear using a 25 gauge needle. Total injection of 1 ml.  Pt remained supine for 20 minutes and was released to their own care.    We will monitor for now and patient will follow-up in 2 months, sooner with any return of symptoms or problems.  To continue with medications for her Ménière's.      Thank you very much for the opportunity to participate in the care of your patient.    Rick L Nissen MD

## 2019-02-26 ENCOUNTER — DOCUMENTATION ONLY (OUTPATIENT)
Dept: CARE COORDINATION | Facility: CLINIC | Age: 64
End: 2019-02-26

## 2019-09-30 ENCOUNTER — HEALTH MAINTENANCE LETTER (OUTPATIENT)
Age: 64
End: 2019-09-30

## 2021-01-15 ENCOUNTER — HEALTH MAINTENANCE LETTER (OUTPATIENT)
Age: 66
End: 2021-01-15

## 2021-10-24 ENCOUNTER — HEALTH MAINTENANCE LETTER (OUTPATIENT)
Age: 66
End: 2021-10-24

## 2021-10-26 ENCOUNTER — TELEPHONE (OUTPATIENT)
Dept: OTOLARYNGOLOGY | Facility: CLINIC | Age: 66
End: 2021-10-26

## 2021-10-26 DIAGNOSIS — R42 DIZZINESS: Primary | ICD-10-CM

## 2021-10-26 RX ORDER — METHYLPREDNISOLONE 4 MG
TABLET, DOSE PACK ORAL
Qty: 21 TABLET | Refills: 0 | Status: SHIPPED | OUTPATIENT
Start: 2021-10-26 | End: 2021-12-21

## 2021-10-26 NOTE — TELEPHONE ENCOUNTER
Pt says she is has had 6 episodes in the last 3 weeks. They last from 2 mins to 25 mins. It makes her nauseous. She says her hearing has been gradually getting worse over the 3 weeks also. She takes hydrochlorothiazide, benadryl at night, niacin and follows a low salt diet. I said I would discuss a plan with the provider and get back to her.   LOV 12/11/18.     Tracey Carroll LPN

## 2021-10-26 NOTE — TELEPHONE ENCOUNTER
Dr. Nissen would like to have her try a medrol pack. If no improvement, she would need to come in for a WIN and visit with him. Pt is agreeable to the plan.    Tracey Carroll LPN

## 2021-10-26 NOTE — TELEPHONE ENCOUNTER
[facility] Call Center    Phone Message    May a detailed message be left on voicemail: yes     Reason for Call: Other:   Pt has an appt with Nissen in December. Pt states that she is miserable. Pt starting to get dizzy spell. She has nausea. Pt would like to see if she can come in for a sooner appt. Please advise.     Action Taken: Other:  ent     Travel Screening: Not Applicable

## 2021-11-03 ENCOUNTER — TELEPHONE (OUTPATIENT)
Dept: OTOLARYNGOLOGY | Facility: CLINIC | Age: 66
End: 2021-11-03

## 2021-11-03 NOTE — TELEPHONE ENCOUNTER
[facility] Call Center    Phone Message    May a detailed message be left on voicemail: yes     Reason for Call: Symptoms or Concerns     If patient has red-flag symptoms, warm transfer to triage line    Current symptom or concern: Vertigo, dizziness,   methylPREDNISolone (MEDROL DOSEPAK) 4 MG tablet therapy pack  This didn't help  Symptoms have been present for:  ? week(s)    Has patient previously been seen for this? Yes    By : Dr Nissen    Date: 12/11/2018    Are there any new or worsening symptoms? Yes      Action Taken: Message routed to:  [facility] ([facility]): ENT    Travel Screening: Not Applicable

## 2021-11-03 NOTE — TELEPHONE ENCOUNTER
Spoke to the pt. We have her scheduled for [facility] at 9 am on 11/9 and Dr. Nissen at 9:30 am. No further questions.    Tracey Carroll LPN

## 2021-11-08 DIAGNOSIS — R42 DIZZINESS: Primary | ICD-10-CM

## 2021-11-09 ENCOUNTER — OFFICE VISIT (OUTPATIENT)
Dept: OTOLARYNGOLOGY | Facility: CLINIC | Age: 66
End: 2021-11-09
Payer: COMMERCIAL

## 2021-11-09 ENCOUNTER — OFFICE VISIT (OUTPATIENT)
Dept: AUDIOLOGY | Facility: CLINIC | Age: 66
End: 2021-11-09
Payer: COMMERCIAL

## 2021-11-09 VITALS
OXYGEN SATURATION: 98 % | HEART RATE: 75 BPM | TEMPERATURE: 98.4 F | HEIGHT: 62 IN | BODY MASS INDEX: 22.82 KG/M2 | WEIGHT: 124 LBS

## 2021-11-09 DIAGNOSIS — H81.02 MENIERE'S DISEASE, LEFT: Primary | ICD-10-CM

## 2021-11-09 DIAGNOSIS — R42 DIZZINESS: ICD-10-CM

## 2021-11-09 DIAGNOSIS — H90.3 ASYMMETRICAL SENSORINEURAL HEARING LOSS: ICD-10-CM

## 2021-11-09 DIAGNOSIS — H90.42 SENSORINEURAL HEARING LOSS (SNHL) OF LEFT EAR WITH UNRESTRICTED HEARING OF RIGHT EAR: Primary | ICD-10-CM

## 2021-11-09 DIAGNOSIS — H93.12 TINNITUS, LEFT: ICD-10-CM

## 2021-11-09 PROCEDURE — 92565 STENGER TEST PURE TONE: CPT | Performed by: AUDIOLOGIST

## 2021-11-09 PROCEDURE — 92550 TYMPANOMETRY & REFLEX THRESH: CPT | Mod: 52 | Performed by: AUDIOLOGIST

## 2021-11-09 PROCEDURE — 92557 COMPREHENSIVE HEARING TEST: CPT | Performed by: AUDIOLOGIST

## 2021-11-09 PROCEDURE — 99214 OFFICE O/P EST MOD 30 MIN: CPT | Mod: 25 | Performed by: OTOLARYNGOLOGY

## 2021-11-09 PROCEDURE — 69801 INCISE INNER EAR: CPT | Mod: LT | Performed by: OTOLARYNGOLOGY

## 2021-11-09 ASSESSMENT — PAIN SCALES - GENERAL: PAINLEVEL: NO PAIN (0)

## 2021-11-09 ASSESSMENT — MIFFLIN-ST. JEOR: SCORE: 1055.71

## 2021-11-09 NOTE — PROGRESS NOTES
AUDIOLOGY REPORT    SUMMARY: Audiology visit completed. See audiogram for results.      RECOMMENDATIONS: Follow-up with ENT.    Deana Hogan, [facility]  Licensed Audiologist  [place] #[id number]

## 2021-11-09 NOTE — PROGRESS NOTES
Dear Zafar Bhakta:    I had the pleasure of meeting Madelin Story in consultation today at the [facility] at your request.    CHIEF COMPLAINT: Dizziness, left Ménière's    HISTORY OF PRESENT ILLNESS: Patient is a 66-year-old in today with her .  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  She began having symptoms in 2014, severe symptoms in 2018 which led to the diagnosis of Ménière's and treatment with 3 Ménière's medications.  She underwent 3 steroid injections at that time as well.  She had significant left sensorineural hearing loss which rebounded with the injections.  She is not been seen now for 3 years.  On her follow-up today, she says she began having problems about 2 months ago.  She is having having episodes of spinning with occasional nausea.  Has not vomited.  Symptoms usually last half hour, much shorter than they have been in the past.  She is noticed the left hearing has dramatically decreased past 2 months.  Does have left tinnitus which fluctuates.  She has been on 3 Ménière's medications.  She is under a lot of stress recently with selling their house and looking to move.    ALLERGIES:    Allergies   Allergen Reactions     Penicillin [Penicillins] Swelling     Sulfa Drugs Nausea and Vomiting     Cefuroxime Nausea and Vomiting     PN: Vomiting     Clindamycin Rash     Gadolinium Derivatives Itching     PN: Just itching. Gadavist given on 7/9/14 for MR Brain WWO exam.        HABITS: Social History    Substance and Sexual Activity      Alcohol use: No     History   Smoking Status     Former Smoker     Packs/day: 1.00     Years: 10.00     Types: Cigarettes     Start date: 6/15/1973     Quit date: 1/1/1985   Smokeless Tobacco     Never Used         PAST MEDICAL HISTORY: Please see today's intake form (for the remainder of the PMH) which I reviewed and signed.  Past Medical History:   Diagnosis Date     Anxiety      Arthritis      Hearing  problem      Meniere's disease      Osteoporosis      Tinnitus        FAMILY HISTORY/SOCIAL HISTORY:   Family History   Problem Relation Age of Onset     Cancer Father      Depression Sister      Ear Disorder No family hx of       Social History     Socioeconomic History     Marital status:      Spouse name: Not on file     Number of children: 4     Years of education: Not on file     Highest education level: Not on file   Occupational History     Employer: NONE    Tobacco Use     Smoking status: Former Smoker     Packs/day: 1.00     Years: 10.00     Pack years: 10.00     Types: Cigarettes     Start date: 6/15/1973     Quit date: 1985     Years since quittin.8     Smokeless tobacco: Never Used   Substance and Sexual Activity     Alcohol use: No     Drug use: No     Sexual activity: Yes     Partners: Male     Birth control/protection: None     Comment: hysterectomy   Other Topics Concern     Not on file   Social History Narrative     Not on file     Social Determinants of Health     Financial Resource Strain: Not on file   Food Insecurity: Not on file   Transportation Needs: Not on file   Physical Activity: Not on file   Stress: Not on file   Social Connections: Not on file   Intimate Partner Violence: Not on file   Housing Stability: Not on file       REVIEW OF SYSTEMS: Patient Supplied Answers to Review of Systems   ENT ROS 2021   Constitutional Problems with sleep   Neurology Dizzy spells   Psychology -   Ears, Nose, Throat Hearing loss, Ear pain, Ringing/noise in ears   Cardiopulmonary -   Musculoskeletal -   Hematologic -   Endocrine -            The remainder of the 10 point ROS is negative    PHYSICIAL EXAMINATION:  Constitutional: The patient was well-groomed and in no acute distress.   Skin: Warm and pink.  Psychiatric: The patient's affect was calm, cooperative, and appropriate.   Respiratory: Breathing comfortably without stridor or exertion of accessory muscles.  Eyes: Pupils were  equal and reactive. Extraocular movement intact.   Head: Normocephalic and atraumatic. No lesions or scars.  Ears: Patient placed under the microscope for microscopic evaluation and cleaning of cerumen which was obscuring full visualization and complete assessment of both TMs. Under high power magnification, the right ear was examined and cleaned of cerumen using curet, alligator forceps, and suction.  After cleaning, TM is fully visualized and has normal position with normal middle ear aeration. The left ear was then cleaned and inspected using microscope, instruments and similar techniques. After cleaning of cerumen, the TM has normal position with normal aeration to middle ear.  Nose: Sinuses were nontender. Anterior rhinoscopy revealed midline septum and absence of purulence or polyps.  Oral Cavity: Normal tongue, floor of mouth, buccal mucosa, and palate. No lesions or masses on inspection or palpation. No abnormal lymph tissue in the oropharynx.   Neck: The parotid is soft without masses. Supple with normal laryngeal and tracheal landmarks.   Lymphatic: There is no palpable lymphadenopathy or other masses in the neck.   Neurologic: Alert and oriented x 3. Cranial nerves III-XI within normal limits. Voice quality normal.  Cerebellar Function Tests:  Grossly normal    Audiogram: Audiogram performed shows moderate severe mainly high-frequency sensorineural hearing loss in the left ear, discrimination still good at 92%.  Normal hearing on the right.  Reviewing audiogram from August 2018 shows she had a severe sensory hearing loss with only 24% discrimination on the left.  Her last test in December 2018 showed normal hearing in the left ear with 100% discrimination bilaterally.      IMPRESSION AND PLAN:   1. Left Ménière's: Certainly characteristic for Ménière's with episodic vertigo and dramatic decrease in the left hearing loss.  MRI in the past has been negative.  Discussed Ménière's in detail.  She has been on  3 Ménière's medications throughout all this.  Would recommend steroid injections try to calm the ear down and continue with medications, salt avoidance.  Discussed all this in detail and she agrees.  Injection completed without incident.  She will follow-up in 2 to 3 weeks with audiogram.  2. Dizziness: Secondary to Ménière's, treatment as above.  3. Left asymmetrical sensorineural hearing loss: Secondary to Ménière's, treatment as above.  4. Left tinnitus: Secondary to Ménière's, treatment as above.    Procedure note: Patient placed supine.  Left ear inspected.  Drop of phenol was placed on the posterior superior quadrant.  Dexamethasone solution of 24 mg/cc was injected into the left middle ear using 25-gauge needle.  She remained supine for 20 minutes and was released to her own care.    Thank you very much for the opportunity to participate in the care of your patient.    Rick L Nissen MD      Left

## 2021-11-09 NOTE — NURSING NOTE
"Chief Complaint   Patient presents with     RECHECK     follow up      Pulse 75, temperature 98.4  F (36.9  C), height 1.575 m (5' 2\"), weight 56.2 kg (124 lb), SpO2 98 %.    Smooth Brewster LPN    "

## 2021-11-09 NOTE — LETTER
11/9/2021       RE: Madelin Story  [address]  [place] [address]     Dear Colleague,    Thank you for referring your patient, Madelin Story, to the [facility] [place] [facility]. Please see a copy of my visit note below.    Dear Zafar Bhakta:    I had the pleasure of meeting Madelin Story in consultation today at the [facility] at your request.    CHIEF COMPLAINT: Dizziness, left Ménière's    HISTORY OF PRESENT ILLNESS: Patient is a 66-year-old in today with her .  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  She began having symptoms in 2014, severe symptoms in 2018 which led to the diagnosis of Ménière's and treatment with 3 Ménière's medications.  She underwent 3 steroid injections at that time as well.  She had significant left sensorineural hearing loss which rebounded with the injections.  She is not been seen now for 3 years.  On her follow-up today, she says she began having problems about 2 months ago.  She is having having episodes of spinning with occasional nausea.  Has not vomited.  Symptoms usually last half hour, much shorter than they have been in the past.  She is noticed the left hearing has dramatically decreased past 2 months.  Does have left tinnitus which fluctuates.  She has been on 3 Ménière's medications.  She is under a lot of stress recently with selling their house and looking to move.    ALLERGIES:    Allergies   Allergen Reactions     Penicillin [Penicillins] Swelling     Sulfa Drugs Nausea and Vomiting     Cefuroxime Nausea and Vomiting     PN: Vomiting     Clindamycin Rash     Gadolinium Derivatives Itching     PN: Just itching. Gadavist given on 7/9/14 for MR Brain WWO exam.        HABITS: Social History    Substance and Sexual Activity      Alcohol use: No     History   Smoking Status     Former Smoker     Packs/day: 1.00     Years: 10.00      Types: Cigarettes     Start date: 6/15/1973     Quit date: 1985   Smokeless Tobacco     Never Used         PAST MEDICAL HISTORY: Please see today's intake form (for the remainder of the PMH) which I reviewed and signed.  Past Medical History:   Diagnosis Date     Anxiety      Arthritis      Hearing problem      Meniere's disease      Osteoporosis      Tinnitus        FAMILY HISTORY/SOCIAL HISTORY:   Family History   Problem Relation Age of Onset     Cancer Father      Depression Sister      Ear Disorder No family hx of       Social History     Socioeconomic History     Marital status:      Spouse name: Not on file     Number of children: 4     Years of education: Not on file     Highest education level: Not on file   Occupational History     Employer: NONE    Tobacco Use     Smoking status: Former Smoker     Packs/day: 1.00     Years: 10.00     Pack years: 10.00     Types: Cigarettes     Start date: 6/15/1973     Quit date: 1985     Years since quittin.8     Smokeless tobacco: Never Used   Substance and Sexual Activity     Alcohol use: No     Drug use: No     Sexual activity: Yes     Partners: Male     Birth control/protection: None     Comment: hysterectomy   Other Topics Concern     Not on file   Social History Narrative     Not on file     Social Determinants of Health     Financial Resource Strain: Not on file   Food Insecurity: Not on file   Transportation Needs: Not on file   Physical Activity: Not on file   Stress: Not on file   Social Connections: Not on file   Intimate Partner Violence: Not on file   Housing Stability: Not on file       REVIEW OF SYSTEMS: Patient Supplied Answers to Review of Systems   ENT ROS 2021   Constitutional Problems with sleep   Neurology Dizzy spells   Psychology -   Ears, Nose, Throat Hearing loss, Ear pain, Ringing/noise in ears   Cardiopulmonary -   Musculoskeletal -   Hematologic -   Endocrine -            The remainder of the 10 point ROS is  negative    PHYSICIAL EXAMINATION:  Constitutional: The patient was well-groomed and in no acute distress.   Skin: Warm and pink.  Psychiatric: The patient's affect was calm, cooperative, and appropriate.   Respiratory: Breathing comfortably without stridor or exertion of accessory muscles.  Eyes: Pupils were equal and reactive. Extraocular movement intact.   Head: Normocephalic and atraumatic. No lesions or scars.  Ears: Patient placed under the microscope for microscopic evaluation and cleaning of cerumen which was obscuring full visualization and complete assessment of both TMs. Under high power magnification, the right ear was examined and cleaned of cerumen using curet, alligator forceps, and suction.  After cleaning, TM is fully visualized and has normal position with normal middle ear aeration. The left ear was then cleaned and inspected using microscope, instruments and similar techniques. After cleaning of cerumen, the TM has normal position with normal aeration to middle ear.  Nose: Sinuses were nontender. Anterior rhinoscopy revealed midline septum and absence of purulence or polyps.  Oral Cavity: Normal tongue, floor of mouth, buccal mucosa, and palate. No lesions or masses on inspection or palpation. No abnormal lymph tissue in the oropharynx.   Neck: The parotid is soft without masses. Supple with normal laryngeal and tracheal landmarks.   Lymphatic: There is no palpable lymphadenopathy or other masses in the neck.   Neurologic: Alert and oriented x 3. Cranial nerves III-XI within normal limits. Voice quality normal.  Cerebellar Function Tests:  Grossly normal    Audiogram: Audiogram performed shows moderate severe mainly high-frequency sensorineural hearing loss in the left ear, discrimination still good at 92%.  Normal hearing on the right.  Reviewing audiogram from August 2018 shows she had a severe sensory hearing loss with only 24% discrimination on the left.  Her last test in December 2018 showed  normal hearing in the left ear with 100% discrimination bilaterally.      IMPRESSION AND PLAN:   1. Left Ménière's: Certainly characteristic for Ménière's with episodic vertigo and dramatic decrease in the left hearing loss.  MRI in the past has been negative.  Discussed Ménière's in detail.  She has been on 3 Ménière's medications throughout all this.  Would recommend steroid injections try to calm the ear down and continue with medications, salt avoidance.  Discussed all this in detail and she agrees.  Injection completed without incident.  She will follow-up in 2 to 3 weeks with audiogram.  2. Dizziness: Secondary to Ménière's, treatment as above.  3. Left asymmetrical sensorineural hearing loss: Secondary to Ménière's, treatment as above.  4. Left tinnitus: Secondary to Ménière's, treatment as above.    Procedure note: Patient placed supine.  Left ear inspected.  Drop of phenol was placed on the posterior superior quadrant.  Dexamethasone solution of 24 mg/cc was injected into the left middle ear using 25-gauge needle.  She remained supine for 20 minutes and was released to her own care.    Thank you very much for the opportunity to participate in the care of your patient.    Rick L Nissen MD

## 2021-12-01 NOTE — PATIENT INSTRUCTIONS
1. You were seen in the ENT Clinic today by Dr. Nissen.  If you have any questions or concerns after your appointment, please call   - Option 1: ENT Clinic: [phone]   - Option 2: Tracey (Dr. Nissen's Nurse): [phone]                   Emily(Dr. Nissen's Nurse): [phone]      2.   Plan to return to clinic in 2 weeks with hearing test    Tracey Carroll LPN  [facility] - Otolaryngology

## 2021-12-07 ENCOUNTER — OFFICE VISIT (OUTPATIENT)
Dept: AUDIOLOGY | Facility: CLINIC | Age: 66
End: 2021-12-07
Attending: OTOLARYNGOLOGY
Payer: COMMERCIAL

## 2021-12-07 ENCOUNTER — OFFICE VISIT (OUTPATIENT)
Dept: OTOLARYNGOLOGY | Facility: CLINIC | Age: 66
End: 2021-12-07
Payer: COMMERCIAL

## 2021-12-07 VITALS
HEIGHT: 62 IN | BODY MASS INDEX: 23.37 KG/M2 | HEART RATE: 69 BPM | TEMPERATURE: 98.2 F | WEIGHT: 127 LBS | OXYGEN SATURATION: 98 %

## 2021-12-07 DIAGNOSIS — H81.02 MENIERE'S DISEASE, LEFT: Primary | ICD-10-CM

## 2021-12-07 DIAGNOSIS — H90.3 ASYMMETRICAL SENSORINEURAL HEARING LOSS: ICD-10-CM

## 2021-12-07 DIAGNOSIS — H90.42 SENSORINEURAL HEARING LOSS (SNHL) OF LEFT EAR WITH UNRESTRICTED HEARING OF RIGHT EAR: Primary | ICD-10-CM

## 2021-12-07 DIAGNOSIS — H81.02 MENIERE'S DISEASE, LEFT: ICD-10-CM

## 2021-12-07 DIAGNOSIS — R42 DIZZINESS: ICD-10-CM

## 2021-12-07 DIAGNOSIS — H93.12 TINNITUS, LEFT: ICD-10-CM

## 2021-12-07 PROCEDURE — 99214 OFFICE O/P EST MOD 30 MIN: CPT | Mod: 25 | Performed by: OTOLARYNGOLOGY

## 2021-12-07 PROCEDURE — 92550 TYMPANOMETRY & REFLEX THRESH: CPT | Performed by: AUDIOLOGIST

## 2021-12-07 PROCEDURE — 69801 INCISE INNER EAR: CPT | Mod: LT | Performed by: OTOLARYNGOLOGY

## 2021-12-07 PROCEDURE — 92557 COMPREHENSIVE HEARING TEST: CPT | Performed by: AUDIOLOGIST

## 2021-12-07 RX ORDER — HYDROCODONE BITARTRATE AND ACETAMINOPHEN 5; 325 MG/1; MG/1
1-2 TABLET ORAL
COMMUNITY
Start: 2021-12-05 | End: 2022-11-10

## 2021-12-07 RX ORDER — MELOXICAM 7.5 MG/1
7.5 TABLET ORAL DAILY
COMMUNITY
Start: 2021-11-26

## 2021-12-07 RX ORDER — SODIUM FLUORIDE 6.1 MG/ML
GEL, DENTIFRICE DENTAL
COMMUNITY
Start: 2021-10-19

## 2021-12-07 RX ORDER — CYCLOBENZAPRINE HCL 5 MG
TABLET ORAL
COMMUNITY
Start: 2021-10-09 | End: 2023-03-27

## 2021-12-07 ASSESSMENT — MIFFLIN-ST. JEOR: SCORE: 1069.32

## 2021-12-07 ASSESSMENT — PAIN SCALES - GENERAL: PAINLEVEL: NO PAIN (0)

## 2021-12-07 NOTE — PROGRESS NOTES
Dear Zafar Bhakta:    I had the pleasure of seeing Madelin Story in followup today at the [facility].    CHIEF COMPLAINT: Left Ménière's, dizziness    HISTORY OF PRESENT ILLNESS: Patient is a 66-year-old in today for follow-up from her last visit on 11/9/2021.  She was having dizziness at that time and underwent her first transtympanic steroid injection.  She began having symptoms 2014, had significant dizziness beginning 2018 with diagnosis of Ménière's and was put on 3 Ménière's medications.  Also had 3 steroid injections at that time.  Her hearing loss on the left side rebounded and she is doing quite well with the dizziness until recently.  She began having symptoms this past September.  She was having several attacks per week, episodes lasting about half hour with spinning vertigo.  She is noticed left fluctuating hearing loss and increased left tinnitus with the episodes.  She has been under a lot of stress recently, they currently are process of selling her house and moving, along with holidays and other stress issues.  Since her last visit and last injection, still is with the episodic vertigo.  Again left fluctuating hearing and left increased tinnitus.  She notices the left hearing is fluctuating, questions whether she is seeing much improvement with the injection.  Looking at old audiograms she had a severe left sensorineural hearing loss in 2018 with discrimination down to 24%.  Following 3 injections and treatment her hearing  rebounded to normal levels which has been stable until recent attacks and issues.    MEDICATIONS: Please refer to the detailed medication reconciliation performed by my nurse today, which I have reviewed and signed.     ALLERGIES:    Allergies   Allergen Reactions     Penicillin [Penicillins] Swelling     Sulfa Drugs Nausea and Vomiting     Cefuroxime Nausea and Vomiting     PN: Vomiting     Clindamycin Rash     Gadolinium Derivatives  Itching     PN: Just itching. Gadavist given on 14 for MR Brain WWO exam.        HABITS: Social History    Substance and Sexual Activity      Alcohol use: No     History   Smoking Status     Former Smoker     Packs/day: 1.00     Years: 10.00     Types: Cigarettes     Start date: 6/15/1973     Quit date: 1985   Smokeless Tobacco     Never Used         PAST MEDICAL HISTORY:  Please see today's intake form (for the remainder of the PMH) which I reviewed and signed.  Past Medical History:   Diagnosis Date     Anxiety      Arthritis      Hearing problem      Meniere's disease      Osteoporosis      Tinnitus        FAMILY HISTORY/SOCIAL HISTORY:    Family History   Problem Relation Age of Onset     Cancer Father      Depression Sister      Ear Disorder No family hx of       Social History     Socioeconomic History     Marital status:      Spouse name: Not on file     Number of children: 4     Years of education: Not on file     Highest education level: Not on file   Occupational History     Employer: NONE    Tobacco Use     Smoking status: Former Smoker     Packs/day: 1.00     Years: 10.00     Pack years: 10.00     Types: Cigarettes     Start date: 6/15/1973     Quit date: 1985     Years since quittin.9     Smokeless tobacco: Never Used   Substance and Sexual Activity     Alcohol use: No     Drug use: No     Sexual activity: Yes     Partners: Male     Birth control/protection: None     Comment: hysterectomy   Other Topics Concern     Not on file   Social History Narrative     Not on file     Social Determinants of Health     Financial Resource Strain: Not on file   Food Insecurity: Not on file   Transportation Needs: Not on file   Physical Activity: Not on file   Stress: Not on file   Social Connections: Not on file   Intimate Partner Violence: Not on file   Housing Stability: Not on file       REVIEW OF SYSTEMS: Patient Supplied Answers to Review of Systems  [facility] ENT ROS 2021   Constitutional  Problems with sleep   Neurology -   Psychology -   Ears, Nose, Throat Ringing/noise in ears, Nasal congestion or drainage   Cardiopulmonary -   Musculoskeletal -   Hematologic -   Endocrine -            The remainder of the 10 point ROS is negative    PHYSICIAL EXAMINATION:  Constitutional: The patient was well-groomed and in no acute distress.   Skin: Warm and pink.  Psychiatric: The patient's affect was calm, cooperative, and appropriate.   Respiratory: Breathing comfortably without stridor or exertion of accessory muscles.  Eyes: Pupils were equal and reactive. Extraocular movement intact.   Head: Normocephalic and atraumatic. No lesions or scars.  Ears: Patient placed under the microscope for microscopic evaluation and cleaning of cerumen which was obscuring full visualization and complete assessment of both TMs. Under high power magnification, the right ear was examined and cleaned of cerumen using curet, alligator forceps, and suction.  After cleaning, TM is fully visualized and has normal position with normal middle ear aeration. The left ear was then cleaned and inspected using microscope, instruments and similar techniques. After cleaning of cerumen, the TM has normal position with normal aeration to middle ear.  Left tympanic membrane looks intact with healing of the injection site.  Nose: Sinuses were nontender. Anterior rhinoscopy revealed midline septum and absence of purulence or polyps.  Oral Cavity: Normal tongue, floor of mouth, buccal mucosa, and palate. No abnormal lymph tissue in the oropharynx.   Neck: The parotid is soft without masses. Supple with normal laryngeal and tracheal landmarks.   Lymphatic: There is no palpable lymphadenopathy or other masses in the neck.   Neurologic: Alert and oriented x 3. Cranial nerves III-XI within normal limits. Voice quality normal.  Cerebellar Function Tests:  Grossly normal    Audiogram: Audiogram performed shows normal hearing in the right ear.  The left  ear shows mild sensorineural hearing loss through the speech frequencies with a downsloping high-frequency sensorineural hearing loss, fairly stable from before.  Discrimination remains good at 100% on the right and 96% on the left.    IMPRESSION AND PLAN:   1. Left Ménière's: Cause for the episodic vertigo and tinnitus fluctuation, hearing fluctuation.  Discussed further injections, risk with persistent perforation, drainage, continued symptoms etc.  She desires to proceed injection completed without incident.  She will follow-up in 2 weeks for more closely monitoring.  2. Left asymmetrical sensorineural hearing loss: Secondary to Ménière's, treatment as above.  3. Left tinnitus: Secondary to Ménière's, treatment as above.  4. Dizziness: Secondary to Ménière's, treatment as above.    PROCEDURE NOTE: Patient placed supine.  Left ear was inspected.  Drop of phenol was placed on the posterior superior quadrant.  Dexamethasone and a solution of 24 mg/cc was injected into the left middle ear using 25-gauge needle.  Total injection 1 cc.  She remained supine for 20 minutes and was released to her own care.    Thank you very much for the opportunity to participate in the care of your patient.    Rick L Nissen MD

## 2021-12-07 NOTE — PROGRESS NOTES
AUDIOLOGY REPORT    SUMMARY: Audiology visit completed. See audiogram for results.      RECOMMENDATIONS: Follow-up with ENT.    Neha Sharma.  Licensed Audiologist  [place] # [id number]

## 2021-12-07 NOTE — NURSING NOTE
"Chief Complaint   Patient presents with     RECHECK     tinnitus and dizziness      Pulse 69, temperature 98.2  F (36.8  C), height 1.575 m (5' 2\"), weight 57.6 kg (127 lb), SpO2 98 %.    Smooth Brewster LPN    "

## 2021-12-07 NOTE — LETTER
12/7/2021       RE: Madelin Story  [address]  [place] [address]     Dear Colleague,    Thank you for referring your patient, Madelin Story, to the [facility] EAR NOSE AND THROAT CLINIC [place] at [facility]. Please see a copy of my visit note below.    Dear Zafar Bhakta:    I had the pleasure of seeing Madelin Story in followup today at the [facility].    CHIEF COMPLAINT: Left Ménière's, dizziness    HISTORY OF PRESENT ILLNESS: Patient is a 66-year-old in today for follow-up from her last visit on 11/9/2021.  She was having dizziness at that time and underwent her first transtympanic steroid injection.  She began having symptoms 2014, had significant dizziness beginning 2018 with diagnosis of Ménière's and was put on 3 Ménière's medications.  Also had 3 steroid injections at that time.  Her hearing loss on the left side rebounded and she is doing quite well with the dizziness until recently.  She began having symptoms this past September.  She was having several attacks per week, episodes lasting about half hour with spinning vertigo.  She is noticed left fluctuating hearing loss and increased left tinnitus with the episodes.  She has been under a lot of stress recently, they currently are process of selling her house and moving, along with holidays and other stress issues.  Since her last visit and last injection, still is with the episodic vertigo.  Again left fluctuating hearing and left increased tinnitus.  She notices the left hearing is fluctuating, questions whether she is seeing much improvement with the injection.  Looking at old audiograms she had a severe left sensorineural hearing loss in 2018 with discrimination down to 24%.  Following 3 injections and treatment her hearing  rebounded to normal levels which has been stable until recent attacks and issues.    MEDICATIONS: Please refer to the  detailed medication reconciliation performed by my nurse today, which I have reviewed and signed.     ALLERGIES:    Allergies   Allergen Reactions     Penicillin [Penicillins] Swelling     Sulfa Drugs Nausea and Vomiting     Cefuroxime Nausea and Vomiting     PN: Vomiting     Clindamycin Rash     Gadolinium Derivatives Itching     PN: Just itching. Gadavist given on 14 for MR Brain WWO exam.        HABITS: Social History    Substance and Sexual Activity      Alcohol use: No     History   Smoking Status     Former Smoker     Packs/day: 1.00     Years: 10.00     Types: Cigarettes     Start date: 6/15/1973     Quit date: 1985   Smokeless Tobacco     Never Used         PAST MEDICAL HISTORY:  Please see today's intake form (for the remainder of the PMH) which I reviewed and signed.  Past Medical History:   Diagnosis Date     Anxiety      Arthritis      Hearing problem      Meniere's disease      Osteoporosis      Tinnitus        FAMILY HISTORY/SOCIAL HISTORY:    Family History   Problem Relation Age of Onset     Cancer Father      Depression Sister      Ear Disorder No family hx of       Social History     Socioeconomic History     Marital status:      Spouse name: Not on file     Number of children: 4     Years of education: Not on file     Highest education level: Not on file   Occupational History     Employer: NONE    Tobacco Use     Smoking status: Former Smoker     Packs/day: 1.00     Years: 10.00     Pack years: 10.00     Types: Cigarettes     Start date: 6/15/1973     Quit date: 1985     Years since quittin.9     Smokeless tobacco: Never Used   Substance and Sexual Activity     Alcohol use: No     Drug use: No     Sexual activity: Yes     Partners: Male     Birth control/protection: None     Comment: hysterectomy   Other Topics Concern     Not on file   Social History Narrative     Not on file     Social Determinants of Health     Financial Resource Strain: Not on file   Food Insecurity:  Not on file   Transportation Needs: Not on file   Physical Activity: Not on file   Stress: Not on file   Social Connections: Not on file   Intimate Partner Violence: Not on file   Housing Stability: Not on file       REVIEW OF SYSTEMS: Patient Supplied Answers to Review of Systems   ENT ROS 12/7/2021   Constitutional Problems with sleep   Neurology -   Psychology -   Ears, Nose, Throat Ringing/noise in ears, Nasal congestion or drainage   Cardiopulmonary -   Musculoskeletal -   Hematologic -   Endocrine -            The remainder of the 10 point ROS is negative    PHYSICIAL EXAMINATION:  Constitutional: The patient was well-groomed and in no acute distress.   Skin: Warm and pink.  Psychiatric: The patient's affect was calm, cooperative, and appropriate.   Respiratory: Breathing comfortably without stridor or exertion of accessory muscles.  Eyes: Pupils were equal and reactive. Extraocular movement intact.   Head: Normocephalic and atraumatic. No lesions or scars.  Ears: Patient placed under the microscope for microscopic evaluation and cleaning of cerumen which was obscuring full visualization and complete assessment of both TMs. Under high power magnification, the right ear was examined and cleaned of cerumen using curet, alligator forceps, and suction.  After cleaning, TM is fully visualized and has normal position with normal middle ear aeration. The left ear was then cleaned and inspected using microscope, instruments and similar techniques. After cleaning of cerumen, the TM has normal position with normal aeration to middle ear.  Left tympanic membrane looks intact with healing of the injection site.  Nose: Sinuses were nontender. Anterior rhinoscopy revealed midline septum and absence of purulence or polyps.  Oral Cavity: Normal tongue, floor of mouth, buccal mucosa, and palate. No abnormal lymph tissue in the oropharynx.   Neck: The parotid is soft without masses. Supple with normal laryngeal and tracheal  landmarks.   Lymphatic: There is no palpable lymphadenopathy or other masses in the neck.   Neurologic: Alert and oriented x 3. Cranial nerves III-XI within normal limits. Voice quality normal.  Cerebellar Function Tests:  Grossly normal    Audiogram: Audiogram performed shows normal hearing in the right ear.  The left ear shows mild sensorineural hearing loss through the speech frequencies with a downsloping high-frequency sensorineural hearing loss, fairly stable from before.  Discrimination remains good at 100% on the right and 96% on the left.    IMPRESSION AND PLAN:   1. Left Ménière's: Cause for the episodic vertigo and tinnitus fluctuation, hearing fluctuation.  Discussed further injections, risk with persistent perforation, drainage, continued symptoms etc.  She desires to proceed injection completed without incident.  She will follow-up in 2 weeks for more closely monitoring.  2. Left asymmetrical sensorineural hearing loss: Secondary to Ménière's, treatment as above.  3. Left tinnitus: Secondary to Ménière's, treatment as above.  4. Dizziness: Secondary to Ménière's, treatment as above.    PROCEDURE NOTE: Patient placed supine.  Left ear was inspected.  Drop of phenol was placed on the posterior superior quadrant.  Dexamethasone and a solution of 24 mg/cc was injected into the left middle ear using 25-gauge needle.  Total injection 1 cc.  She remained supine for 20 minutes and was released to her own care.    Thank you very much for the opportunity to participate in the care of your patient.    Rick L Nissen MD

## 2021-12-20 NOTE — PATIENT INSTRUCTIONS
1. You were seen in the ENT Clinic today by Dr. Nissen.  If you have any questions or concerns after your appointment, please call   - Option 1: ENT Clinic: [phone]   - Option 2: Tracey (Dr. Nissen's Nurse): [phone]                   Emily(Dr. Nissen's Nurse): [phone]      2.   Plan to return to clinic in early to mid January with hearing test    3. Medrol pack    Tracey Carroll LPN  [facility] - Otolaryngology

## 2021-12-21 ENCOUNTER — OFFICE VISIT (OUTPATIENT)
Dept: OTOLARYNGOLOGY | Facility: CLINIC | Age: 66
End: 2021-12-21
Payer: COMMERCIAL

## 2021-12-21 ENCOUNTER — OFFICE VISIT (OUTPATIENT)
Dept: AUDIOLOGY | Facility: CLINIC | Age: 66
End: 2021-12-21
Attending: OTOLARYNGOLOGY
Payer: COMMERCIAL

## 2021-12-21 VITALS
HEIGHT: 62 IN | SYSTOLIC BLOOD PRESSURE: 116 MMHG | HEART RATE: 72 BPM | DIASTOLIC BLOOD PRESSURE: 72 MMHG | WEIGHT: 124 LBS | BODY MASS INDEX: 22.82 KG/M2 | TEMPERATURE: 98.2 F | OXYGEN SATURATION: 95 %

## 2021-12-21 DIAGNOSIS — H90.42 SENSORINEURAL HEARING LOSS (SNHL) OF LEFT EAR WITH UNRESTRICTED HEARING OF RIGHT EAR: Primary | ICD-10-CM

## 2021-12-21 DIAGNOSIS — H90.3 ASYMMETRICAL SENSORINEURAL HEARING LOSS: ICD-10-CM

## 2021-12-21 DIAGNOSIS — H81.02 MENIERE'S DISEASE, LEFT: Primary | ICD-10-CM

## 2021-12-21 DIAGNOSIS — H81.02 MENIERE'S DISEASE, LEFT: ICD-10-CM

## 2021-12-21 DIAGNOSIS — R42 DIZZINESS: ICD-10-CM

## 2021-12-21 PROCEDURE — 99214 OFFICE O/P EST MOD 30 MIN: CPT | Mod: 25 | Performed by: OTOLARYNGOLOGY

## 2021-12-21 PROCEDURE — 69801 INCISE INNER EAR: CPT | Mod: LT | Performed by: OTOLARYNGOLOGY

## 2021-12-21 PROCEDURE — 92557 COMPREHENSIVE HEARING TEST: CPT | Performed by: AUDIOLOGIST-HEARING AID FITTER

## 2021-12-21 PROCEDURE — 92550 TYMPANOMETRY & REFLEX THRESH: CPT | Mod: 52 | Performed by: AUDIOLOGIST-HEARING AID FITTER

## 2021-12-21 RX ORDER — METHYLPREDNISOLONE 4 MG
TABLET, DOSE PACK ORAL
Qty: 21 TABLET | Refills: 0 | Status: SHIPPED | OUTPATIENT
Start: 2021-12-21 | End: 2022-05-17

## 2021-12-21 ASSESSMENT — MIFFLIN-ST. JEOR: SCORE: 1055.71

## 2021-12-21 ASSESSMENT — PAIN SCALES - GENERAL: PAINLEVEL: NO PAIN (0)

## 2021-12-21 NOTE — LETTER
12/21/2021       RE: Madelin Story  [address]  [facility] [address]     Dear Colleague,    Thank you for referring your patient, Madelin Story, to the [facility] EAR NOSE AND THROAT CLINIC [place] at [facility]. Please see a copy of my visit note below.    Dear Zafar Bhakta:    I had the pleasure of seeing Madelin Story in followup today at the [facility] Ot[facility]aryng[facility].    CHIEF COMPLAINT: Left Ménière's, dizziness    HISTORY OF PRESENT ILLNESS: Patient is a 66-year-old in today for follow-up from her last visit 3 weeks ago.  She been diagnosed with Ménière's since about 2018.  She been on 3 Ménière's medications initially underwent 3 steroid injections to get her dizziness and vertigo under control.  She been doing well since then until this past September.  She had return of symptoms with episodic vertigo left ear symptoms with hearing fluctuation and left tinnitus.  She been under a lot of stress lately with selling their house, the holidays, moving.  They are actually moving tomorrow into their new house.  On her follow-up today, she says she is having maybe 4-5 episodes over the last 2 weeks.  They have been short maybe only lasting 10 to 20 minutes.  She has noticed left hearing fluctuation and left tinnitus.  There is been no pain or drainage.    MEDICATIONS: Please refer to the detailed medication reconciliation performed by my nurse today, which I have reviewed and signed.     ALLERGIES:    Allergies   Allergen Reactions     Penicillin [Penicillins] Swelling     Sulfa Drugs Nausea and Vomiting     Cefuroxime Nausea and Vomiting     PN: Vomiting     Clindamycin Rash     Gadolinium Derivatives Itching     PN: Just itching. Gadavist given on 7/9/14 for MR Brain WWO exam.        HABITS: Social History    Substance and Sexual Activity      Alcohol use: No     History   Smoking Status     Former Smoker     Packs/day: 1.00     Years:  10.00     Types: Cigarettes     Start date: 6/15/1973     Quit date: 1985   Smokeless Tobacco     Never Used         PAST MEDICAL HISTORY:  Please see today's intake form (for the remainder of the PMH) which I reviewed and signed.  Past Medical History:   Diagnosis Date     Anxiety      Arthritis      Hearing problem      Meniere's disease      Osteoporosis      Tinnitus        FAMILY HISTORY/SOCIAL HISTORY:    Family History   Problem Relation Age of Onset     Cancer Father      Depression Sister      Ear Disorder No family hx of       Social History     Socioeconomic History     Marital status:      Spouse name: Not on file     Number of children: 4     Years of education: Not on file     Highest education level: Not on file   Occupational History     Employer: NONE    Tobacco Use     Smoking status: Former Smoker     Packs/day: 1.00     Years: 10.00     Pack years: 10.00     Types: Cigarettes     Start date: 6/15/1973     Quit date: 1985     Years since quittin.9     Smokeless tobacco: Never Used   Substance and Sexual Activity     Alcohol use: No     Drug use: No     Sexual activity: Yes     Partners: Male     Birth control/protection: None     Comment: hysterectomy   Other Topics Concern     Not on file   Social History Narrative     Not on file     Social Determinants of Health     Financial Resource Strain: Not on file   Food Insecurity: Not on file   Transportation Needs: Not on file   Physical Activity: Not on file   Stress: Not on file   Social Connections: Not on file   Intimate Partner Violence: Not on file   Housing Stability: Not on file       REVIEW OF SYSTEMS: Patient Supplied Answers to Review of Systems  [facility] ENT ROS 2021   Constitutional Problems with sleep   Neurology Dizzy spells, Headache   Psychology Frequently feeling anxious   Ears, Nose, Throat Ringing/noise in ears   Cardiopulmonary -   Musculoskeletal -   Hematologic -   Endocrine -            The remainder of the  10 point ROS is negative    PHYSICIAL EXAMINATION:  Constitutional: The patient was well-groomed and in no acute distress.   Skin: Warm and pink.  Psychiatric: The patient's affect was calm, cooperative, and appropriate.   Respiratory: Breathing comfortably without stridor or exertion of accessory muscles.  Eyes: Pupils were equal and reactive. Extraocular movement intact.   Head: Normocephalic and atraumatic. No lesions or scars.  Ears: Both ears examined under microscope.  Right side looks stable with normal tympanic membrane and middle ear.  Left side shows a posterior superior pinpoint perforation where the injection was performed.  Looks to be healing nicely.  Nose: Sinuses were nontender. Anterior rhinoscopy revealed midline septum and absence of purulence or polyps.  Oral Cavity: Normal tongue, floor of mouth, buccal mucosa, and palate. No abnormal lymph tissue in the oropharynx.   Neck: The parotid is soft without masses. Supple with normal laryngeal and tracheal landmarks.   Lymphatic: There is no palpable lymphadenopathy or other masses in the neck.   Neurologic: Alert and oriented x 3. Cranial nerves III-XI within normal limits. Voice quality normal.  Cerebellar Function Tests:  Grossly normal    Audiogram: Audiogram performed shows normal hearing in the right ear.  Left ear shows a moderate sensorineural hearing loss to all frequencies, discrimination at 88% today on the left and 100% on the right.    IMPRESSION AND PLAN:   1. Left Ménière's: Discussed this with her in detail.  Symptoms have been a lot worse with the increased stress she has been under recently.  She continues with the Dyazide, niacin, Benadryl.  She feels has improved with the steroid injections.  We discussed further injection today to get her through the holidays.  Risk of this with persistent perforation, continued symptoms, unforeseen complications etc. all discussed.  She desires to proceed injection completed out incident.  Also  placed her on Medrol Dosepak to help with the holidays coming.  2. Asymmetrical left sensorineural hearing loss: Secondary to Ménière's, treatment as above.  3. Dizziness: Secondary to Ménière's, treatment as above.    Procedure note: Patient placed supine.  Left ear is examined with the microscope.  Under high-power magnification left ear was visualized and dexamethasone and a solution of 24 mg/cc was injected through the old injection site using 25-gauge needle.  Total injection of 1 cc.  She remained supine for 20 minutes and was released to her own care.    Thank you very much for the opportunity to participate in the care of your patient.    Rick L Nissen MD

## 2021-12-21 NOTE — NURSING NOTE
"Chief Complaint   Patient presents with     RECHECK     2 week follow up      Blood pressure 116/72, pulse 72, temperature 98.2  F (36.8  C), height 1.575 m (5' 2\"), weight 56.2 kg (124 lb), SpO2 95 %.    Smooth Brewster LPN    "

## 2021-12-21 NOTE — PROGRESS NOTES
Dear Zafar Bhakta:    I had the pleasure of seeing Madelin Story in followup today at the [facility] Otolaryngology [facility].    CHIEF COMPLAINT: Left Ménière's, dizziness    HISTORY OF PRESENT ILLNESS: Patient is a 66-year-old in today for follow-up from her last visit 3 weeks ago.  She been diagnosed with Ménière's since about 2018.  She been on 3 Ménière's medications initially underwent 3 steroid injections to get her dizziness and vertigo under control.  She been doing well since then until this past September.  She had return of symptoms with episodic vertigo left ear symptoms with hearing fluctuation and left tinnitus.  She been under a lot of stress lately with selling their house, the holidays, moving.  They are actually moving tomorrow into their new house.  On her follow-up today, she says she is having maybe 4-5 episodes over the last 2 weeks.  They have been short maybe only lasting 10 to 20 minutes.  She has noticed left hearing fluctuation and left tinnitus.  There is been no pain or drainage.    MEDICATIONS: Please refer to the detailed medication reconciliation performed by my nurse today, which I have reviewed and signed.     ALLERGIES:    Allergies   Allergen Reactions     Penicillin [Penicillins] Swelling     Sulfa Drugs Nausea and Vomiting     Cefuroxime Nausea and Vomiting     PN: Vomiting     Clindamycin Rash     Gadolinium Derivatives Itching     PN: Just itching. Gadavist given on 7/9/14 for MR Brain WWO exam.        HABITS: Social History    Substance and Sexual Activity      Alcohol use: No     History   Smoking Status     Former Smoker     Packs/day: 1.00     Years: 10.00     Types: Cigarettes     Start date: 6/15/1973     Quit date: 1/1/1985   Smokeless Tobacco     Never Used         PAST MEDICAL HISTORY:  Please see today's intake form (for the remainder of the PMH) which I reviewed and signed.  Past Medical History:   Diagnosis Date     Anxiety      Arthritis      Hearing  problem      Meniere's disease      Osteoporosis      Tinnitus        FAMILY HISTORY/SOCIAL HISTORY:    Family History   Problem Relation Age of Onset     Cancer Father      Depression Sister      Ear Disorder No family hx of       Social History     Socioeconomic History     Marital status:      Spouse name: Not on file     Number of children: 4     Years of education: Not on file     Highest education level: Not on file   Occupational History     Employer: NONE    Tobacco Use     Smoking status: Former Smoker     Packs/day: 1.00     Years: 10.00     Pack years: 10.00     Types: Cigarettes     Start date: 6/15/1973     Quit date: 1985     Years since quittin.9     Smokeless tobacco: Never Used   Substance and Sexual Activity     Alcohol use: No     Drug use: No     Sexual activity: Yes     Partners: Male     Birth control/protection: None     Comment: hysterectomy   Other Topics Concern     Not on file   Social History Narrative     Not on file     Social Determinants of Health     Financial Resource Strain: Not on file   Food Insecurity: Not on file   Transportation Needs: Not on file   Physical Activity: Not on file   Stress: Not on file   Social Connections: Not on file   Intimate Partner Violence: Not on file   Housing Stability: Not on file       REVIEW OF SYSTEMS: Patient Supplied Answers to Review of Systems   ENT ROS 2021   Constitutional Problems with sleep   Neurology Dizzy spells, Headache   Psychology Frequently feeling anxious   Ears, Nose, Throat Ringing/noise in ears   Cardiopulmonary -   Musculoskeletal -   Hematologic -   Endocrine -            The remainder of the 10 point ROS is negative    PHYSICIAL EXAMINATION:  Constitutional: The patient was well-groomed and in no acute distress.   Skin: Warm and pink.  Psychiatric: The patient's affect was calm, cooperative, and appropriate.   Respiratory: Breathing comfortably without stridor or exertion of accessory muscles.  Eyes:  Pupils were equal and reactive. Extraocular movement intact.   Head: Normocephalic and atraumatic. No lesions or scars.  Ears: Both ears examined under microscope.  Right side looks stable with normal tympanic membrane and middle ear.  Left side shows a posterior superior pinpoint perforation where the injection was performed.  Looks to be healing nicely.  Nose: Sinuses were nontender. Anterior rhinoscopy revealed midline septum and absence of purulence or polyps.  Oral Cavity: Normal tongue, floor of mouth, buccal mucosa, and palate. No abnormal lymph tissue in the oropharynx.   Neck: The parotid is soft without masses. Supple with normal laryngeal and tracheal landmarks.   Lymphatic: There is no palpable lymphadenopathy or other masses in the neck.   Neurologic: Alert and oriented x 3. Cranial nerves III-XI within normal limits. Voice quality normal.  Cerebellar Function Tests:  Grossly normal    Audiogram: Audiogram performed shows normal hearing in the right ear.  Left ear shows a moderate sensorineural hearing loss to all frequencies, discrimination at 88% today on the left and 100% on the right.    IMPRESSION AND PLAN:   1. Left Ménière's: Discussed this with her in detail.  Symptoms have been a lot worse with the increased stress she has been under recently.  She continues with the Dyazide, niacin, Benadryl.  She feels has improved with the steroid injections.  We discussed further injection today to get her through the holidays.  Risk of this with persistent perforation, continued symptoms, unforeseen complications etc. all discussed.  She desires to proceed injection completed out incident.  Also placed her on Medrol Dosepak to help with the holidays coming.  2. Asymmetrical left sensorineural hearing loss: Secondary to Ménière's, treatment as above.  3. Dizziness: Secondary to Ménière's, treatment as above.    Procedure note: Patient placed supine.  Left ear is examined with the microscope.  Under  high-power magnification left ear was visualized and dexamethasone and a solution of 24 mg/cc was injected through the old injection site using 25-gauge needle.  Total injection of 1 cc.  She remained supine for 20 minutes and was released to her own care.    Thank you very much for the opportunity to participate in the care of your patient.    Rick L Nissen MD

## 2021-12-21 NOTE — PROGRESS NOTES
AUDIOLOGY REPORT    SUMMARY: Audiology visit completed. See audiogram for results.      RECOMMENDATIONS: Follow-up with ENT.      Deana Ospina, CCC-A, [facility]  Licensed Audiologist  [place] #[id number]

## 2022-02-13 ENCOUNTER — HEALTH MAINTENANCE LETTER (OUTPATIENT)
Age: 67
End: 2022-02-13

## 2022-02-21 DIAGNOSIS — H81.02 MENIERE'S DISEASE, LEFT: Primary | ICD-10-CM

## 2022-04-19 ENCOUNTER — OFFICE VISIT (OUTPATIENT)
Dept: AUDIOLOGY | Facility: CLINIC | Age: 67
End: 2022-04-19
Attending: OTOLARYNGOLOGY
Payer: COMMERCIAL

## 2022-04-19 ENCOUNTER — OFFICE VISIT (OUTPATIENT)
Dept: OTOLARYNGOLOGY | Facility: CLINIC | Age: 67
End: 2022-04-19
Payer: COMMERCIAL

## 2022-04-19 VITALS
HEIGHT: 62 IN | WEIGHT: 126 LBS | TEMPERATURE: 98.5 F | BODY MASS INDEX: 23.19 KG/M2 | HEART RATE: 67 BPM | OXYGEN SATURATION: 99 % | DIASTOLIC BLOOD PRESSURE: 68 MMHG | SYSTOLIC BLOOD PRESSURE: 110 MMHG

## 2022-04-19 DIAGNOSIS — H93.8X2 EAR PRESSURE, LEFT: ICD-10-CM

## 2022-04-19 DIAGNOSIS — R42 DIZZINESS: ICD-10-CM

## 2022-04-19 DIAGNOSIS — H90.3 ASYMMETRICAL SENSORINEURAL HEARING LOSS: ICD-10-CM

## 2022-04-19 DIAGNOSIS — H93.12 TINNITUS, LEFT: ICD-10-CM

## 2022-04-19 DIAGNOSIS — H81.02 MENIERE'S DISEASE, LEFT: Primary | ICD-10-CM

## 2022-04-19 DIAGNOSIS — H81.02 MENIERE'S DISEASE, LEFT: ICD-10-CM

## 2022-04-19 DIAGNOSIS — H90.42 SENSORINEURAL HEARING LOSS (SNHL) OF LEFT EAR WITH UNRESTRICTED HEARING OF RIGHT EAR: Primary | ICD-10-CM

## 2022-04-19 PROCEDURE — 69801 INCISE INNER EAR: CPT | Mod: LT | Performed by: OTOLARYNGOLOGY

## 2022-04-19 PROCEDURE — 92550 TYMPANOMETRY & REFLEX THRESH: CPT | Performed by: AUDIOLOGIST

## 2022-04-19 PROCEDURE — 92557 COMPREHENSIVE HEARING TEST: CPT | Performed by: AUDIOLOGIST

## 2022-04-19 PROCEDURE — 99214 OFFICE O/P EST MOD 30 MIN: CPT | Mod: 25 | Performed by: OTOLARYNGOLOGY

## 2022-04-19 ASSESSMENT — PAIN SCALES - GENERAL: PAINLEVEL: MILD PAIN (2)

## 2022-04-19 NOTE — NURSING NOTE
"Chief Complaint   Patient presents with     RECHECK     Follow up     Blood pressure 110/68, pulse 67, temperature 98.5  F (36.9  C), height 1.575 m (5' 2\"), weight 57.2 kg (126 lb), SpO2 99 %.    Smooth Brewster LPN    "

## 2022-04-19 NOTE — LETTER
4/19/2022       RE: Madelin Story  [address] [place] [address]     Dear Colleague,    Thank you for referring your patient, Madelin Story, to the [facility]. Please see a copy of my visit note below.    Dear Zafar Bhakta:    I had the pleasure of seeing Madelin Story in followup today at the [facility].    CHIEF COMPLAINT: Left Ménière's, dizziness    HISTORY OF PRESENT ILLNESS: Patient is a 66-year-old in today for follow-up from her last visit on 12/21/2021.  At that visit, she underwent her third steroid injection for suspected left Ménière's.  She was diagnosed with Ménière's in 2018.  She was having significant dizziness at that time.  She was started on 3 Ménière's medication and underwent 3 steroid injections, did better after that.  She was doing well until this past September her symptoms returned.  She was having a lot of stress around that time with moving, holidays, kids, etc.  She was tried on oral steroids and again has been on Ménière's medications.  Recently underwent 3 injections.  On her follow-up today, she says she did better for a short time.  She still having episodic vertigo but they are short.  They only last for 3 to 5 minutes, no vomiting but does get nausea.  She gets maybe 2 to 3 days a week.  With that she will notice left ear pressure, increased left tinnitus, and feels the hearing on the left side is fluctuating.  She says her tinnitus actually seems to bother her the most.  Dizziness is irritating and problematic though.  She did have an MRI scan in 2018 which was negative.    MEDICATIONS: Please refer to the detailed medication reconciliation performed by my nurse today, which I have reviewed and signed.     ALLERGIES:    Allergies   Allergen Reactions     Penicillin [Penicillins] Swelling     Sulfa Drugs Nausea and Vomiting      Cefuroxime Nausea and Vomiting     PN: Vomiting     Clindamycin Rash     Gadolinium Derivatives Itching     PN: Just itching. Gadavist given on 14 for MR Brain WWO exam.        HABITS: Social History    Substance and Sexual Activity      Alcohol use: No     History   Smoking Status     Former Smoker     Packs/day: 1.00     Years: 10.00     Types: Cigarettes     Start date: 6/15/1973     Quit date: 1985   Smokeless Tobacco     Never Used         PAST MEDICAL HISTORY:  Please see today's intake form (for the remainder of the PMH) which I reviewed and signed.  Past Medical History:   Diagnosis Date     Anxiety      Arthritis      Hearing problem      Meniere's disease      Osteoporosis      Tinnitus        FAMILY HISTORY/SOCIAL HISTORY:    Family History   Problem Relation Age of Onset     Cancer Father      Depression Sister      Ear Disorder No family hx of       Social History     Socioeconomic History     Marital status:      Spouse name: Not on file     Number of children: 4     Years of education: Not on file     Highest education level: Not on file   Occupational History     Employer: NONE    Tobacco Use     Smoking status: Former Smoker     Packs/day: 1.00     Years: 10.00     Pack years: 10.00     Types: Cigarettes     Start date: 6/15/1973     Quit date: 1985     Years since quittin.3     Smokeless tobacco: Never Used   Substance and Sexual Activity     Alcohol use: No     Drug use: No     Sexual activity: Yes     Partners: Male     Birth control/protection: None     Comment: hysterectomy   Other Topics Concern     Not on file   Social History Narrative     Not on file     Social Determinants of Health     Financial Resource Strain: Not on file   Food Insecurity: Not on file   Transportation Needs: Not on file   Physical Activity: Not on file   Stress: Not on file   Social Connections: Not on file   Intimate Partner Violence: Not on file   Housing Stability: Not on file       REVIEW OF  SYSTEMS: Patient Supplied Answers to Review of Systems   ENT ROS 4/19/2022   Constitutional -   Neurology Dizzy spells   Psychology -   Ears, Nose, Throat Ear pain, Ringing/noise in ears, Nasal congestion or drainage, Sore throat, Trouble swallowing   Cardiopulmonary Cough   Musculoskeletal -   Hematologic -   Endocrine -            The remainder of the 10 point ROS is negative    PHYSICIAL EXAMINATION:  Constitutional: The patient was well-groomed and in no acute distress.   Skin: Warm and pink.  Psychiatric: The patient's affect was calm, cooperative, and appropriate.   Respiratory: Breathing comfortably without stridor or exertion of accessory muscles.  Eyes: Pupils were equal and reactive. Extraocular movement intact.   Head: Normocephalic and atraumatic. No lesions or scars.  Ears: Patient placed under the microscope for microscopic evaluation and cleaning of cerumen which was obscuring full visualization and complete assessment of both TMs. Under high power magnification, the right ear was examined and cleaned of cerumen using curet, alligator forceps, and suction.  After cleaning, TM is fully visualized and has normal position with normal middle ear aeration. The left ear was then cleaned and inspected using microscope, instruments and similar techniques. After cleaning of cerumen, the TM has normal position with normal aeration to middle ear.  Nose: Sinuses were nontender. Anterior rhinoscopy revealed midline septum and absence of purulence or polyps.  Oral Cavity: Normal tongue, floor of mouth, buccal mucosa, and palate. No abnormal lymph tissue in the oropharynx.   Neck: The parotid is soft without masses. Supple with normal laryngeal and tracheal landmarks.   Lymphatic: There is no palpable lymphadenopathy or other masses in the neck.   Neurologic: Alert and oriented x 3. Cranial nerves III-XI within normal limits. Voice quality normal.  Cerebellar Function Tests:  Grossly normal    Audiogram: Audiogram  performed shows normal hearing in the right ear.  Left ear shows a moderate to severe downsloping high-frequency sensorineural hearing loss.  100% discrimination on the right, 88% on the left.  Bilateral type A tympanograms.    IMPRESSION AND PLAN:   1. Dizziness: Again symptoms are suggestive for left Ménière's.  She is on medication for that.  They are improved but still present, PACs are short but very irritating for her.  Discussed oral steroid trial or injection.  She prefers injection, it has helped in the past.  Injection completed today without incident.  2. Left asymmetrical sensorineural hearing loss: Secondary to Ménière's, discussed option for hearing aids, should pursue this at her discretion place location.  Treatment for Ménière's as above.  3. Left tinnitus: Secondary to Ménière's and hearing loss.  Treatment as above  4. Left ear pressure: Secondary to Ménière's, treatment as above.  5. Left Ménière's: Cause for symptoms above, continue with Dyazide and Benadryl, injection as above.  Patient will follow-up in 1 month to monitor.    PROCEDURE NOTE: Patient placed supine.  Left ear was inspected.  A drop of phenol was placed on the posterior superior quadrant.  dexamethasone solution of 24 mg/cc was injected into the left middle ear using 25-gauge needle.  Total injection of 1 cc.  She remained supine for 20 minutes and was released to her own care.    Thank you very much for the opportunity to participate in the care of your patient.        Again, thank you for allowing me to participate in the care of your patient.      Sincerely,    Rick L. Nissen, MD

## 2022-04-19 NOTE — PROGRESS NOTES
Dear Zafar Bhakta:    I had the pleasure of seeing Madelin Story in followup today at the [facility].    CHIEF COMPLAINT: Left Ménière's, dizziness    HISTORY OF PRESENT ILLNESS: Patient is a 66-year-old in today for follow-up from her last visit on 12/21/2021.  At that visit, she underwent her third steroid injection for suspected left Ménière's.  She was diagnosed with Ménière's in 2018.  She was having significant dizziness at that time.  She was started on 3 Ménière's medication and underwent 3 steroid injections, did better after that.  She was doing well until this past September her symptoms returned.  She was having a lot of stress around that time with moving, holidays, kids, etc.  She was tried on oral steroids and again has been on Ménière's medications.  Recently underwent 3 injections.  On her follow-up today, she says she did better for a short time.  She still having episodic vertigo but they are short.  They only last for 3 to 5 minutes, no vomiting but does get nausea.  She gets maybe 2 to 3 days a week.  With that she will notice left ear pressure, increased left tinnitus, and feels the hearing on the left side is fluctuating.  She says her tinnitus actually seems to bother her the most.  Dizziness is irritating and problematic though.  She did have an MRI scan in 2018 which was negative.    MEDICATIONS: Please refer to the detailed medication reconciliation performed by my nurse today, which I have reviewed and signed.     ALLERGIES:    Allergies   Allergen Reactions     Penicillin [Penicillins] Swelling     Sulfa Drugs Nausea and Vomiting     Cefuroxime Nausea and Vomiting     PN: Vomiting     Clindamycin Rash     Gadolinium Derivatives Itching     PN: Just itching. Gadavist given on 7/9/14 for MR Brain WWO exam.        HABITS: Social History    Substance and Sexual Activity      Alcohol use: No     History   Smoking Status     Former Smoker     Packs/day:  1.00     Years: 10.00     Types: Cigarettes     Start date: 6/15/1973     Quit date: 1985   Smokeless Tobacco     Never Used         PAST MEDICAL HISTORY:  Please see today's intake form (for the remainder of the PMH) which I reviewed and signed.  Past Medical History:   Diagnosis Date     Anxiety      Arthritis      Hearing problem      Meniere's disease      Osteoporosis      Tinnitus        FAMILY HISTORY/SOCIAL HISTORY:    Family History   Problem Relation Age of Onset     Cancer Father      Depression Sister      Ear Disorder No family hx of       Social History     Socioeconomic History     Marital status:      Spouse name: Not on file     Number of children: 4     Years of education: Not on file     Highest education level: Not on file   Occupational History     Employer: NONE    Tobacco Use     Smoking status: Former Smoker     Packs/day: 1.00     Years: 10.00     Pack years: 10.00     Types: Cigarettes     Start date: 6/15/1973     Quit date: 1985     Years since quittin.3     Smokeless tobacco: Never Used   Substance and Sexual Activity     Alcohol use: No     Drug use: No     Sexual activity: Yes     Partners: Male     Birth control/protection: None     Comment: hysterectomy   Other Topics Concern     Not on file   Social History Narrative     Not on file     Social Determinants of Health     Financial Resource Strain: Not on file   Food Insecurity: Not on file   Transportation Needs: Not on file   Physical Activity: Not on file   Stress: Not on file   Social Connections: Not on file   Intimate Partner Violence: Not on file   Housing Stability: Not on file       REVIEW OF SYSTEMS: Patient Supplied Answers to Review of Systems   ENT ROS 2022   Constitutional -   Neurology Dizzy spells   Psychology -   Ears, Nose, Throat Ear pain, Ringing/noise in ears, Nasal congestion or drainage, Sore throat, Trouble swallowing   Cardiopulmonary Cough   Musculoskeletal -   Hematologic -    Endocrine -            The remainder of the 10 point ROS is negative    PHYSICIAL EXAMINATION:  Constitutional: The patient was well-groomed and in no acute distress.   Skin: Warm and pink.  Psychiatric: The patient's affect was calm, cooperative, and appropriate.   Respiratory: Breathing comfortably without stridor or exertion of accessory muscles.  Eyes: Pupils were equal and reactive. Extraocular movement intact.   Head: Normocephalic and atraumatic. No lesions or scars.  Ears: Patient placed under the microscope for microscopic evaluation and cleaning of cerumen which was obscuring full visualization and complete assessment of both TMs. Under high power magnification, the right ear was examined and cleaned of cerumen using curet, alligator forceps, and suction.  After cleaning, TM is fully visualized and has normal position with normal middle ear aeration. The left ear was then cleaned and inspected using microscope, instruments and similar techniques. After cleaning of cerumen, the TM has normal position with normal aeration to middle ear.  Nose: Sinuses were nontender. Anterior rhinoscopy revealed midline septum and absence of purulence or polyps.  Oral Cavity: Normal tongue, floor of mouth, buccal mucosa, and palate. No abnormal lymph tissue in the oropharynx.   Neck: The parotid is soft without masses. Supple with normal laryngeal and tracheal landmarks.   Lymphatic: There is no palpable lymphadenopathy or other masses in the neck.   Neurologic: Alert and oriented x 3. Cranial nerves III-XI within normal limits. Voice quality normal.  Cerebellar Function Tests:  Grossly normal    Audiogram: Audiogram performed shows normal hearing in the right ear.  Left ear shows a moderate to severe downsloping high-frequency sensorineural hearing loss.  100% discrimination on the right, 88% on the left.  Bilateral type A tympanograms.    IMPRESSION AND PLAN:   1. Dizziness: Again symptoms are suggestive for left  Ménière's.  She is on medication for that.  They are improved but still present, PACs are short but very irritating for her.  Discussed oral steroid trial or injection.  She prefers injection, it has helped in the past.  Injection completed today without incident.  2. Left asymmetrical sensorineural hearing loss: Secondary to Ménière's, discussed option for hearing aids, should pursue this at her discretion place location.  Treatment for Ménière's as above.  3. Left tinnitus: Secondary to Ménière's and hearing loss.  Treatment as above  4. Left ear pressure: Secondary to Ménière's, treatment as above.  5. Left Ménière's: Cause for symptoms above, continue with Dyazide and Benadryl, injection as above.  Patient will follow-up in 1 month to monitor.    PROCEDURE NOTE: Patient placed supine.  Left ear was inspected.  A drop of phenol was placed on the posterior superior quadrant.  dexamethasone solution of 24 mg/cc was injected into the left middle ear using 25-gauge needle.  Total injection of 1 cc.  She remained supine for 20 minutes and was released to her own care.    Thank you very much for the opportunity to participate in the care of your patient.    Rick L Nissen MD

## 2022-04-19 NOTE — PROGRESS NOTES
AUDIOLOGY REPORT    SUMMARY: Audiology visit completed. See audiogram for results.      RECOMMENDATIONS: Follow-up with ENT.      Marie Hope M.A.   Audiology Doctoral Student #[id number]    I was present with the patient for the entire Audiology appointment, including all procedures/testing performed by the Deana student, and agree with the student s assessment and plan as documented.    Deana Hogan, [facility]  Licensed Audiologist  [place] #[id number]

## 2022-04-19 NOTE — PATIENT INSTRUCTIONS
1. You were seen in the ENT Clinic today by Dr. Nissen.  If you have any questions or concerns after your appointment, please call   - Option 1: ENT Clinic: [phone]   - Option 2: Tracey (Dr. Nissen's Nurse): [phone]                   Emily(Dr. Nissen's Nurse): [phone]      2.   Plan to return to clinic in 4 weeks with hearing test     Tracey Carroll LPN  [facility] - Otolaryngology

## 2022-04-26 DIAGNOSIS — H81.02 MENIERE'S DISEASE, LEFT: Primary | ICD-10-CM

## 2022-05-04 ENCOUNTER — ANCILLARY PROCEDURE (OUTPATIENT)
Dept: GENERAL RADIOLOGY | Facility: CLINIC | Age: 67
End: 2022-05-04
Attending: PHYSICIAN ASSISTANT
Payer: COMMERCIAL

## 2022-05-04 ENCOUNTER — OFFICE VISIT (OUTPATIENT)
Dept: URGENT CARE | Facility: URGENT CARE | Age: 67
End: 2022-05-04
Payer: COMMERCIAL

## 2022-05-04 VITALS
HEART RATE: 87 BPM | DIASTOLIC BLOOD PRESSURE: 68 MMHG | WEIGHT: 126 LBS | RESPIRATION RATE: 16 BRPM | OXYGEN SATURATION: 97 % | BODY MASS INDEX: 23.05 KG/M2 | SYSTOLIC BLOOD PRESSURE: 98 MMHG

## 2022-05-04 DIAGNOSIS — R07.81 RIB PAIN ON RIGHT SIDE: ICD-10-CM

## 2022-05-04 DIAGNOSIS — R07.81 RIB PAIN ON RIGHT SIDE: Primary | ICD-10-CM

## 2022-05-04 PROCEDURE — 71101 X-RAY EXAM UNILAT RIBS/CHEST: CPT | Mod: TC | Performed by: RADIOLOGY

## 2022-05-04 PROCEDURE — 99203 OFFICE O/P NEW LOW 30 MIN: CPT | Performed by: PHYSICIAN ASSISTANT

## 2022-05-04 RX ORDER — FENTANYL CITRATE 50 UG/ML
25-100 INJECTION, SOLUTION INTRAMUSCULAR; INTRAVENOUS
COMMUNITY
Start: 2022-02-10 | End: 2023-03-27

## 2022-05-04 RX ORDER — HYDROCODONE BITARTRATE AND ACETAMINOPHEN 5; 325 MG/1; MG/1
1 TABLET ORAL EVERY 6 HOURS PRN
Qty: 10 TABLET | Refills: 0 | Status: SHIPPED | OUTPATIENT
Start: 2022-05-04 | End: 2022-05-07

## 2022-05-04 RX ORDER — OXYCODONE AND ACETAMINOPHEN 5; 325 MG/1; MG/1
TABLET ORAL
COMMUNITY
Start: 2022-02-10 | End: 2022-11-10

## 2022-05-04 RX ORDER — MELOXICAM 7.5 MG/1
1 TABLET ORAL DAILY
COMMUNITY
Start: 2022-04-19 | End: 2022-11-10

## 2022-05-04 RX ORDER — AMLODIPINE BESYLATE 5 MG/1
1 TABLET ORAL DAILY
COMMUNITY
Start: 2022-03-28 | End: 2022-05-17

## 2022-05-04 RX ORDER — CYCLOBENZAPRINE HCL 10 MG
5 TABLET ORAL AT BEDTIME
Qty: 5 TABLET | Refills: 0 | Status: SHIPPED | OUTPATIENT
Start: 2022-05-04 | End: 2023-04-02

## 2022-05-04 RX ORDER — MIDAZOLAM HYDROCHLORIDE 1 MG/ML
1-2 INJECTION INTRAMUSCULAR; INTRAVENOUS
COMMUNITY
Start: 2022-02-10 | End: 2022-11-10

## 2022-05-04 NOTE — PROGRESS NOTES
"SUBJECTIVE:  Chief Complaint   Patient presents with     Fall     Pt fell 2 days ago and braced herself with R side of body to miss hitting her head on a rock. Pt is now having pain in chest area and when she takes a deep breath she \"feels something in chest\"     Madelin Story is a 66 year old female presents with a chief complaint of right uppe rib pain for 2 days. Worse with deep inhalations, movement.   .  The injury occurred 2 day(s) ago.   The injury happened while at home. How: as above, fall immediate pain.  The patient complained of moderate pain  and has had decreased ROM.  Relieved by nothing.  She treated it initially with no therapy. This is the first time this type of injury has occurred to this patient.     Past Medical History:   Diagnosis Date     Anxiety      Arthritis      Hearing problem      Meniere's disease      Osteoporosis      Tinnitus      Current Outpatient Medications   Medication Sig Dispense Refill     amLODIPine (NORVASC) 5 MG tablet Take 1 tablet by mouth daily       amLODIPine (NORVASC) 5 MG tablet Take 5 mg by mouth       clonazePAM (KLONOPIN) 0.5 MG tablet Take 0.5 mg by mouth       cyclobenzaprine (FLEXERIL) 5 MG tablet        diclofenac (VOLTAREN) 1 % topical gel Place 2 g onto the skin       diphenhydrAMINE (BENADRYL) 25 MG tablet Take 25 mg by mouth       estradiol (VAGIFEM) 10 MCG TABS vaginal tablet Place 10 mcg vaginally       fentaNYL, PF, (SUBLIMAZE) 100 MCG/2ML injection Inject  mcg into the vein       HYDROcodone-acetaminophen (NORCO) 5-325 MG tablet Take 1-2 tablets by mouth       IBUPROFEN 200 MG OR TABS 2-3  hs 120 0     lidocaine (LIDODERM) 5 % Patch PLACE 1 PATCH ONTO THE SKIN EVERY 24 HOURS. (ON 12 AND OFF FOR 12 HRS)       meloxicam (MOBIC) 7.5 MG tablet Take 1 tablet by mouth daily       meloxicam (MOBIC) 7.5 MG tablet        midazolam (VERSED) 2 MG/2ML injection Inject 1-2 mg into the vein       oxyCODONE-acetaminophen (PERCOCET) 5-325 MG tablet        " PAXLOVID therapy pack        pregabalin (LYRICA) 75 MG capsule Take 75 mg by mouth       PREVIDENT 5000 DRY MOUTH 1.1 % GEL topical gel        traMADol (ULTRAM) 50 MG tablet Take 50 mg by mouth       TRAZODONE HCL 50 MG OR TABS 1 1/2 Q HS 3 MONTHS 1 YEAR     hydrochlorothiazide (HYDRODIURIL) 25 MG tablet Take 25 mg by mouth       methylPREDNISolone (MEDROL DOSEPAK) 4 MG tablet therapy pack Take as directed per patient instruction card (Patient not taking: No sig reported) 21 tablet 0     venlafaxine (EFFEXOR-ER) 225 MG TB24 24 hr tablet Take 225 mg by mouth       Social History     Tobacco Use     Smoking status: Former Smoker     Packs/day: 1.00     Years: 10.00     Pack years: 10.00     Types: Cigarettes     Start date: 6/15/1973     Quit date: 1985     Years since quittin.3     Smokeless tobacco: Never Used   Substance Use Topics     Alcohol use: No       ROS:  Review of systems negative except as stated above.    EXAM:   BP 98/68   Pulse 87   Resp 16   Wt 57.2 kg (126 lb)   SpO2 97%   BMI 23.05 kg/m    Gen: healthy,alert,no distress  Extremity: tender ribs, pain with flexion and twisting  GENERAL APPEARANCE: healthy, alert and no distress  CHEST: clear to auscultation  CV: regular rate and rhythm  MS: no gross deformities noted, no evidence of inflammation in joints, FROM in all extremities.  SKIN: no suspicious lesions or rashes  NEURO: Normal strength and tone, sensory exam grossly normal, mentation intact and speech normal    Results for orders placed or performed in visit on 22   XR Ribs & Chest Right G/E 3 Views     Status: None    Narrative    XR RIBS & CHEST RT 3VW 2022 12:15 PM     HISTORY: Rib pain on right side  COMPARISON: None      Impression    IMPRESSION: No definite right rib displaced fracture. The lungs appear  clear. Mild right shoulder joint degenerative changes.    LARA PFEIFFER MD         SYSTEM ID:  [id number]   '      ASSESSMENT:   (R07.81) Rib pain on right  side  (primary encounter diagnosis)  Comment:No evidence of fracture or dislocation  Plan: XR Ribs & Chest Right G/E 3 Views, Orthopedic          Referral, HYDROcodone-acetaminophen         (NORCO) 5-325 MG tablet, cyclobenzaprine         (FLEXERIL) 10 MG tablet      Red flags and emergent follow up discussed, and understood by patient  Follow up with PCP if symptoms worsen or fail to improve      There are no Patient Instructions on file for this visit.

## 2022-05-06 ENCOUNTER — TELEPHONE (OUTPATIENT)
Dept: URGENT CARE | Facility: URGENT CARE | Age: 67
End: 2022-05-06
Payer: COMMERCIAL

## 2022-05-06 NOTE — TELEPHONE ENCOUNTER
Central Prior Authorization Team   Phone: [phone]      EPA denial - waiting for insurance to fax denial letter.

## 2022-05-06 NOTE — TELEPHONE ENCOUNTER
Central Prior Authorization Team   Phone: [phone]      PRIOR AUTHORIZATION DENIED    Medication: cyclobenzaprine (FLEXERIL) 10 MG tablet - EPA DENIED    Denial Date: 5/6/2022    Denial Rational:         Appeal Information:

## 2022-05-09 ENCOUNTER — OFFICE VISIT (OUTPATIENT)
Dept: PALLIATIVE MEDICINE | Facility: CLINIC | Age: 67
End: 2022-05-09
Payer: COMMERCIAL

## 2022-05-09 VITALS — HEART RATE: 78 BPM | SYSTOLIC BLOOD PRESSURE: 121 MMHG | DIASTOLIC BLOOD PRESSURE: 78 MMHG | OXYGEN SATURATION: 98 %

## 2022-05-09 DIAGNOSIS — R07.89 STERNOCOSTAL PAIN: ICD-10-CM

## 2022-05-09 DIAGNOSIS — R07.81 RIB PAIN ON RIGHT SIDE: ICD-10-CM

## 2022-05-09 DIAGNOSIS — S29.011A PECTORALIS MUSCLE STRAIN, INITIAL ENCOUNTER: Primary | ICD-10-CM

## 2022-05-09 PROCEDURE — 99204 OFFICE O/P NEW MOD 45 MIN: CPT | Performed by: PHYSICAL MEDICINE & REHABILITATION

## 2022-05-09 RX ORDER — NIACIN 500 MG
TABLET ORAL
COMMUNITY

## 2022-05-09 ASSESSMENT — PAIN SCALES - GENERAL: PAINLEVEL: EXTREME PAIN (8)

## 2022-05-09 NOTE — PATIENT INSTRUCTIONS
You can take meloxicam 7.5mg twice daily.  You can acetaminophen 1000mg four times daily. If someone prescribes other acetaminophen containing medications, then reduce the acetaminophen to total 4000mg daily.  I ordered physical therapy, you'll be called to set this up.   Follow up in 6 weeks.    Take care,    Hal Collins DO  [facility]      ----------------------------------------------------------------  Clinic Number:  [phone]   Call with any questions about your care and for scheduling assistance.   Calls are returned Monday through Friday between 8 AM and 4:30 PM. We usually get back to you within 2 business days depending on the issue/request.    If we are prescribing your medications:  For opioid medication refills, call the clinic or send a [employer]os[employer] message 7 days in advance.  Please include:  Name of requested medication  Name of the pharmacy.  For non-opioid medications, call your pharmacy directly to request a refill. Please allow 3-4 days to be processed.   Per [place] State Law:  All controlled substance prescriptions must be filled within 30 days of being written.    For those controlled substances allowing refills, pickup must occur within 30 days of last fill.      We believe regular attendance is key to your success in our program!    Any time you are unable to keep your appointment we ask that you call us at least 24 hours in advance to cancel.This will allow us to offer the appointment time to another patient.   Multiple missed appointments may lead to dismissal from the clinic.

## 2022-05-09 NOTE — PROGRESS NOTES
[facility] Medical Spine Consultation    Date of visit: 5/9/2022        Assessment:  Madelin Story is a 66 year old female with a past medical history significant for Meniere's disease, osteoporosis, arthritis, fibromyalgia who presents with complaints of right anterior chest pain. Symptoms began after a fall where they braced with their upper extremities on 5/3/22. No trauma to the chest. XR was negative for fracture.  Exam shows tenderness with palpation of the right anterior ribs, right sternocostal junction and right pectoralis major. Activation of the right pectoralis major also reproduced their pain. Discussed their symptoms are likely due to a pectoralis major strain, costochondritis and possible sternocostal joint inflammation. They were in agreement to working with PT to treat this. Medication changes as detailed below.      Plan:  Diagnosis reviewed, treatment options addressed, and risks/benefits discussed. The patient was involved in shared decision making regarding the plan as laid out below.    1. Education: The patient was educated as to the natural history of their disorder. Reassurance was given and the patient was encouraged to engage in activity and movement as able.  2. Physical Therapy:   PT to evaluate and treat. PT will assist with pacing, coping strategies, stretching, and strengthening as appropriate.  3. Diagnostic Studies:  Chest XR reviewed.  4. Medication Management:  Meloxicam 7.5mg BID  1. Acetaminophen 1000mg q6h prn.  2. They are prescribed Tramadol and have a pain contract with Dr. Navarro. They will discuss alternatives with them.  5. Further procedures recommended: consider sternocostal joint injection is no improvement.  6. Follow up: 6 weeks or as needed.      DO KEREN Shafer [facility] Pain Management        Primary Care Provider: Dr. Zafar Mchugh    Reason for consultation:    Madelin Story is a 66 year old female who is seen in consultation today at the  request of Go Serrano PA-C.     Consultation and Evaluation for: Medical spine evaluation    Review of [place] Prescription Monitoring Program (): Today I have also reviewed the patient's history of controlled substance use, as provided by [place] licensed pharmacies and prescriber dispensers.      Review of Electronic Chart: Today I have also reviewed available medical information in the patient's medical record at [place] ([facility]), including relevant provider notes, laboratory work, and imaging.       Chief Complaint:    Right anterior chest pain.    Pain history:  Madelin Story is a 66 year old female who first started having problems with right anterior chest pain last Tuesday.    Last Tuesday they were in their yard and had a slip and fall and braced them selves with their arms. They hurt their legs but didn't hit their face or head, they are not sure if they hit their chest on anything but they didn't think so. They have been having anterior, right sided chest pain since that time.     Currently they continue to have pain in this area. This is worse with activity, movign their right arm, sneezing etc seem to trigger their pain. When they are walking more and breathing faster their pain worsens.    They are prescribed tramadol for fibromyalgia and arthritis by Dr. Navarro (rheumatology) but this isn't helping with their right anterior chest pain.  Pain is localized to right anterior chest   Radiation of pain: none  Other associated symptoms: denies  History of cancer or weight loss: denies    Pain is described as Aching, Sharp and Stabbing   Quality and timing of pain: constant  Pain rating:averages 8/10 on a 0-10 scale.  Aggravating factors include: activity, breathing  Relieving factors include:Rest  Denies red flags including: bowel or bladder symptoms, fever, chills, saddle anesthesia, profound motor loss, history of cancer, history of immune compromise, weight loss.     Impact of Pain: There is  significant limitation with activity.    Current medication treatments include:  -meloxicam 7.5mg daily  -Lyrica 75mg TID  -Tramadol 50mg prn  Pain medications are being prescribed by Dr. Goyal.        Other treatments have included:  Madelin WRIGHT Story has not been seen at a pain clinic in the past.    Behavioral interventions: nt  PT: nt  Manual Medicine: nt    Interventional:  Acupuncture: nt  TENs Unit: nt  Injections: nt      Past Medical History:  Past Medical History:   Diagnosis Date     Anxiety      Arthritis      Hearing problem      Meniere's disease      Osteoporosis      Tinnitus      Past Surgical History:  Past Surgical History:   Procedure Laterality Date     GENITOURINARY SURGERY       GYN SURGERY       PE TUBES       SURGICAL HISTORY OF -   01/07/05    Diagnostic Lap 2nd to pelvic pain     TONSILLECTOMY       Medications:  Current Outpatient Medications   Medication Sig Dispense Refill     clonazePAM (KLONOPIN) 0.5 MG tablet Take 0.5 mg by mouth       meloxicam (MOBIC) 7.5 MG tablet Take 1 tablet by mouth daily       pregabalin (LYRICA) 75 MG capsule Take 75 mg by mouth       traMADol (ULTRAM) 50 MG tablet Take 50 mg by mouth       TRAZODONE HCL 50 MG OR TABS 1 1/2 Q HS 3 MONTHS 1 YEAR     amLODIPine (NORVASC) 5 MG tablet Take 1 tablet by mouth daily       amLODIPine (NORVASC) 5 MG tablet Take 5 mg by mouth       cyclobenzaprine (FLEXERIL) 10 MG tablet Take 0.5 tablets (5 mg) by mouth At Bedtime 5 tablet 0     cyclobenzaprine (FLEXERIL) 5 MG tablet        diclofenac (VOLTAREN) 1 % topical gel Place 2 g onto the skin       diphenhydrAMINE (BENADRYL) 25 MG tablet Take 25 mg by mouth       estradiol (VAGIFEM) 10 MCG TABS vaginal tablet Place 10 mcg vaginally       fentaNYL, PF, (SUBLIMAZE) 100 MCG/2ML injection Inject  mcg into the vein (Patient not taking: Reported on 5/9/2022)       hydrochlorothiazide (HYDRODIURIL) 25 MG tablet Take 25 mg by mouth       HYDROcodone-acetaminophen (NORCO) 5-325  MG tablet Take 1-2 tablets by mouth (Patient not taking: Reported on 5/9/2022)       IBUPROFEN 200 MG OR TABS 2-3  hs (Patient not taking: Reported on 5/9/2022) 120 0     lidocaine (LIDODERM) 5 % Patch PLACE 1 PATCH ONTO THE SKIN EVERY 24 HOURS. (ON 12 AND OFF FOR 12 HRS)       meloxicam (MOBIC) 7.5 MG tablet  (Patient not taking: Reported on 5/9/2022)       methylPREDNISolone (MEDROL DOSEPAK) 4 MG tablet therapy pack Take as directed per patient instruction card (Patient not taking: No sig reported) 21 tablet 0     midazolam (VERSED) 2 MG/2ML injection Inject 1-2 mg into the vein       oxyCODONE-acetaminophen (PERCOCET) 5-325 MG tablet  (Patient not taking: Reported on 5/9/2022)       PAXLOVID therapy pack        PREVIDENT 5000 DRY MOUTH 1.1 % GEL topical gel        venlafaxine (EFFEXOR-ER) 225 MG TB24 24 hr tablet Take 225 mg by mouth (Patient not taking: Reported on 5/9/2022)       Allergies:     Allergies   Allergen Reactions     Penicillin [Penicillins] Swelling     Sulfa Drugs Nausea and Vomiting     Cefuroxime Nausea and Vomiting     PN: Vomiting     Clindamycin Rash     Gadolinium Derivatives Itching     PN: Just itching. Gadavist given on 7/9/14 for MR Brain WWO exam.          Social History:  Home situation: lives in [place]  Occupation/Schooling:realtor  Tobacco use: former smoker  Alcohol use: denies  Drug use: denies    Chemical dependency: The patient denies any history of treatment for alcohol or drug abuse.    Family history:  Family History   Problem Relation Age of Onset     Cancer Father      Depression Sister      Ear Disorder No family hx of              Diagnostic Tests:  XR RIBS & CHEST RT 3VW 5/4/2022 12:15 PM      HISTORY: Rib pain on right side  COMPARISON: None                                                                      IMPRESSION: No definite right rib displaced fracture. The lungs appear  clear. Mild right shoulder joint degenerative changes.     LARA PFEIFFER MD           [facility] database review: tramadol prescriptions from Dr. Goyal, recent norco refill by Go Serrano. Prior norco prescriptions after RFAs.          Review of Systems:    POSTIVE IN BOLD  GENERAL: fever/chills, fatigue, general unwell feeling, weight gain/loss.  HEAD/EYES:  headache, dizziness, or vision changes.    EARS/NOSE/THROAT:  Nosebleeds, hearing loss, sinus infection, earache, tinnitus.  IMMUNE:  Allergies, cancer, immune deficiency, or infections.  SKIN:  Urticaria, rash, hives  HEME/Lymphatic:   anemia, easy bruising, easy bleeding.  RESPIRATORY:  cough, wheezing, or shortness of breath. Right anterior chest pain  CARDIOVASCULAR/Circulation:  Extremity edema, syncope, hypertension, tachycardia, or angina.  GASTROINTESTINAL:  abdominal pain, nausea/emesis, diarrhea, constipation,  hematochezia, or melena.  ENDOCRINE:  Diabetes, steroid use,  thyroid disease or osteoporosis.  MUSCULOSKELETAL: neck pain, back pain, arthralgia, arthritis, or gout.  GENITOURINARY:  frequency, urgency, dysuria, difficulty voiding, hematuria or incontinence.  NEUROLOGIC:  weakness, numbness, paresthesias, seizure, tremor, stroke or memory loss.  PSYCHIATRIC:  depression, anxiety, stress, suicidal thoughts or mood swings.     Physical Exam:  Vitals:    05/09/22 1130   BP: 121/78   Pulse: 78   SpO2: 98%     Exam:  Constitutional: healthy, alert and no distress  Head: normocephalic. Atraumatic.   Eyes: no redness or jaundice noted   ENT: oropharnx normal.  MMM.   Skin: no suspicious lesions or rashes  Psychiatric: mentation appears normal and affect normal/bright    Musculoskeletal exam:  Gait/Station/Posture: normal  Cervical spine: normal  Thoracic spine:  Normal   Lumbar spine: normal ROM  Myofascial tenderness:  Right anterior rib tenderness, right pectoralis major tenderness, right sternocostal joint tenderness.    Neurologic exam:  Motor:  5/5 UE and LE strength  Reflexes:     Biceps:     R:  2/4 L:  2/4   Brachioradialis   R:  2/4 L: 2/4   Triceps:  R:  2/4 L: 2/4    Sensory:  (upper and lower extremities):   Light touch: normal           BILLING TIME DOCUMENTATION:   The total TIME spent on this patient on the date of the encounter/appointment was 45 minutes.      TOTAL TIME includes:   Time spent preparing to see the patient (reviewing records and tests)   Time spent face to face (or over the phone) with the patient  Time spent ordering tests, medications, procedures and referrals   Time spent Referring and communicating with other healthcare professionals  Time spent documenting clinical information in Epic       Hal Collins D.O.  Medical Spine Specialist  [facility]

## 2022-05-09 NOTE — Clinical Note
Madelin was seen today for their right anterior chest pain. Symptoms likely due to costochondritis and right pect major strain. Referred to PT for exercises. They have medications for pain prescribed by their rheumatologist and will discuss adjustments with them.  Thanks for this referral,  Hal Collins DO [facility]

## 2022-05-17 ENCOUNTER — OFFICE VISIT (OUTPATIENT)
Dept: AUDIOLOGY | Facility: CLINIC | Age: 67
End: 2022-05-17
Attending: OTOLARYNGOLOGY
Payer: COMMERCIAL

## 2022-05-17 ENCOUNTER — TELEPHONE (OUTPATIENT)
Dept: OTOLARYNGOLOGY | Facility: CLINIC | Age: 67
End: 2022-05-17

## 2022-05-17 ENCOUNTER — OFFICE VISIT (OUTPATIENT)
Dept: OTOLARYNGOLOGY | Facility: CLINIC | Age: 67
End: 2022-05-17
Payer: COMMERCIAL

## 2022-05-17 VITALS
SYSTOLIC BLOOD PRESSURE: 153 MMHG | WEIGHT: 125 LBS | DIASTOLIC BLOOD PRESSURE: 78 MMHG | HEIGHT: 62 IN | TEMPERATURE: 98.4 F | HEART RATE: 72 BPM | BODY MASS INDEX: 23 KG/M2

## 2022-05-17 DIAGNOSIS — H93.12 TINNITUS, LEFT: ICD-10-CM

## 2022-05-17 DIAGNOSIS — H81.02 MENIERE'S DISEASE, LEFT: ICD-10-CM

## 2022-05-17 DIAGNOSIS — R42 DIZZINESS: Primary | ICD-10-CM

## 2022-05-17 DIAGNOSIS — H90.42 SENSORINEURAL HEARING LOSS (SNHL) OF LEFT EAR WITH UNRESTRICTED HEARING OF RIGHT EAR: Primary | ICD-10-CM

## 2022-05-17 DIAGNOSIS — H90.3 ASYMMETRICAL SENSORINEURAL HEARING LOSS: ICD-10-CM

## 2022-05-17 PROCEDURE — 92550 TYMPANOMETRY & REFLEX THRESH: CPT | Performed by: AUDIOLOGIST

## 2022-05-17 PROCEDURE — 92504 EAR MICROSCOPY EXAMINATION: CPT | Performed by: OTOLARYNGOLOGY

## 2022-05-17 PROCEDURE — 92557 COMPREHENSIVE HEARING TEST: CPT | Performed by: AUDIOLOGIST

## 2022-05-17 PROCEDURE — 99213 OFFICE O/P EST LOW 20 MIN: CPT | Mod: 25 | Performed by: OTOLARYNGOLOGY

## 2022-05-17 RX ORDER — PREGABALIN 75 MG/1
75 CAPSULE ORAL
COMMUNITY
Start: 2022-05-16 | End: 2022-11-10

## 2022-05-17 ASSESSMENT — PAIN SCALES - GENERAL: PAINLEVEL: NO PAIN (0)

## 2022-05-17 NOTE — LETTER
5/17/2022       RE: Madelin Story  [address] [place] [address]     Dear Colleague,    Thank you for referring your patient, Madelin Story, to the [facility] [place] at [facility]. Please see a copy of my visit note below.    Dear Zafar Bhakta:    I had the pleasure of seeing Madelin Story in followup today at the [facility].    CHIEF COMPLAINT: Dizziness, left Ménière's    HISTORY OF PRESENT ILLNESS: Patient is a 66-year-old in today for follow-up from her last visit of 4/19/2022.  She has been diagnosed with left Ménière's in the past, has had increasing problems with dizziness.  I initially saw her in 2018 and she was diagnosed with Ménière's.  She is having significant episodic vertigo at that time.  MRI scan previously performed elsewhere was negative.  She was started on 3 Ménière's medications.  When she underwent 3 steroid injections and did better after that.  She did well with no issues until this past September when her symptoms returned.  She was having a lot of stress at that time with her children, moving, and the holidays.  She initially was tried on oral steroids and placed back on Ménière's medications.  With continued issues she underwent 3 injections that fall.  Had continued issues.  Her 3 injections were in November.  This winter and spring she been having increasing problems.  She describes spinning episodes that last up to 2 hours, associated with nausea but no vomiting.  She describes probably 3 episodes a week.  With that she will get increased left ear pressure, increased left tinnitus and feels the left hearing is fluctuating.  She underwent an injection at her last visit in April.  She again does not feel that the steroid injections are helping.  She is frustrated today.  She has to leave as she has an appointment, I was behind on my appointments  today.    MEDICATIONS: Please refer to the detailed medication reconciliation performed by my nurse today, which I have reviewed and signed.     ALLERGIES:    Allergies   Allergen Reactions     Penicillin [Penicillins] Swelling     Sulfa Drugs Nausea and Vomiting     Cefuroxime Nausea and Vomiting     PN: Vomiting     Clindamycin Rash     Gadolinium Derivatives Itching     PN: Just itching. Gadavist given on 14 for MR Brain WWO exam.        HABITS: Social History    Substance and Sexual Activity      Alcohol use: No     History   Smoking Status     Former Smoker     Packs/day: 1.00     Years: 10.00     Types: Cigarettes     Start date: 6/15/1973     Quit date: 1985   Smokeless Tobacco     Never Used         PAST MEDICAL HISTORY:  Please see today's intake form (for the remainder of the PMH) which I reviewed and signed.  Past Medical History:   Diagnosis Date     Anxiety      Arthritis      Hearing problem      Meniere's disease      Osteoporosis      Tinnitus        FAMILY HISTORY/SOCIAL HISTORY:    Family History   Problem Relation Age of Onset     Cancer Father      Depression Sister      Ear Disorder No family hx of       Social History     Socioeconomic History     Marital status:      Spouse name: Not on file     Number of children: 4     Years of education: Not on file     Highest education level: Not on file   Occupational History     Employer: NONE    Tobacco Use     Smoking status: Former Smoker     Packs/day: 1.00     Years: 10.00     Pack years: 10.00     Types: Cigarettes     Start date: 6/15/1973     Quit date: 1985     Years since quittin.3     Smokeless tobacco: Never Used   Substance and Sexual Activity     Alcohol use: No     Drug use: No     Sexual activity: Yes     Partners: Male     Birth control/protection: None     Comment: hysterectomy   Other Topics Concern     Not on file   Social History Narrative     Not on file     Social Determinants of Health     Financial  Resource Strain: Not on file   Food Insecurity: Not on file   Transportation Needs: Not on file   Physical Activity: Not on file   Stress: Not on file   Social Connections: Not on file   Intimate Partner Violence: Not on file   Housing Stability: Not on file       REVIEW OF SYSTEMS: Patient Supplied Answers to Review of Systems   ENT ROS 5/17/2022   Constitutional Problems with sleep   Neurology Dizzy spells, Headache   Psychology -   Ears, Nose, Throat Ringing/noise in ears   Cardiopulmonary -   Musculoskeletal -   Hematologic -   Endocrine -            The remainder of the 10 point ROS is negative    PHYSICIAL EXAMINATION:  Constitutional: The patient was well-groomed and in no acute distress.   Skin: Warm and pink.  Psychiatric: The patient's affect was calm, cooperative, and appropriate.   Respiratory: Breathing comfortably without stridor or exertion of accessory muscles.  Eyes: Pupils were equal and reactive. Extraocular movement intact.   Head: Normocephalic and atraumatic. No lesions or scars.  Ears: Patient placed under the microscope for microscopic evaluation and cleaning of cerumen which was obscuring full visualization and complete assessment of both TMs. Under high power magnification, the right ear was examined and cleaned of cerumen using curet, alligator forceps, and suction.  After cleaning, TM is fully visualized and has normal position with normal middle ear aeration. The left ear was then cleaned and inspected using microscope, instruments and similar techniques. After cleaning of cerumen, the TM has normal position with normal aeration to middle ear.  Nose: Sinuses were nontender. Anterior rhinoscopy revealed midline septum and absence of purulence or polyps.  Oral Cavity: Normal tongue, floor of mouth, buccal mucosa, and palate. No abnormal lymph tissue in the oropharynx.   Neck: The parotid is soft without masses. Supple with normal laryngeal and tracheal landmarks.   Lymphatic: There is no  palpable lymphadenopathy or other masses in the neck.   Neurologic: Alert and oriented x 3. Cranial nerves III-XI within normal limits. Voice quality normal.  Cerebellar Function Tests:  Grossly normal    Audiogram: Audiogram performed shows normal hearing in the right ear with 100% discrimination.  The left ear shows a moderate sensorineural hearing loss through the speech frequencies with a increased high-frequency sensorineural hearing loss.  Maintains fairly good discrimination at 92%.    IMPRESSION AND PLAN:   1. Left Ménière's: Discussed this again with her in detail.  She is quite frustrated as she has been having persistent and increasing problems with dizziness since last September.  Has had a negative MRI in the past and symptoms seem consistent with Ménière's with its associated auditory symptoms.  She could not stay for steroid injection today.  We discussed neck steps as endolymphatic shunt operation or consider gentamicin injection.  Initially she was leaning toward the shunt operation.  I did briefly discuss this with her as outpatient surgery success rate etc.  She would like to see one of our surgeons to get their thoughts and recommendations.  She will continue with the diuretic and antihistamine  2. Dizziness: Feel secondary to left Ménière's, treatment as above.  3. Asymmetrical sensorineural hearing loss: Secondary to Ménière's, treatment as above.  4. Left asymmetrical sensorineural hearing loss: Secondary to Ménière's, treatment as above.    Thank you very much for the opportunity to participate in the care of your patient.    Rick L Nissen MD

## 2022-05-17 NOTE — TELEPHONE ENCOUNTER
I tried calling this patient to schedule a [facility] New Neurotology appt with Dr. Cartagena.    Appt Note- Surgical Consult for Meniere's Disease ref by Dr. Nissen (WIN done on 5/17/22)

## 2022-05-17 NOTE — PROGRESS NOTES
Dear Zafar Bhakta:    I had the pleasure of seeing Madelin Story in followup today at the [facility].    CHIEF COMPLAINT: Dizziness, left Ménière's    HISTORY OF PRESENT ILLNESS: Patient is a 66-year-old in today for follow-up from her last visit of 4/19/2022.  She has been diagnosed with left Ménière's in the past, has had increasing problems with dizziness.  I initially saw her in 2018 and she was diagnosed with Ménière's.  She is having significant episodic vertigo at that time.  MRI scan previously performed elsewhere was negative.  She was started on 3 Ménière's medications.  When she underwent 3 steroid injections and did better after that.  She did well with no issues until this past September when her symptoms returned.  She was having a lot of stress at that time with her children, moving, and the holidays.  She initially was tried on oral steroids and placed back on Ménière's medications.  With continued issues she underwent 3 injections that fall.  Had continued issues.  Her 3 injections were in November.  This winter and spring she been having increasing problems.  She describes spinning episodes that last up to 2 hours, associated with nausea but no vomiting.  She describes probably 3 episodes a week.  With that she will get increased left ear pressure, increased left tinnitus and feels the left hearing is fluctuating.  She underwent an injection at her last visit in April.  She again does not feel that the steroid injections are helping.  She is frustrated today.  She has to leave as she has an appointment, I was behind on my appointments today.    MEDICATIONS: Please refer to the detailed medication reconciliation performed by my nurse today, which I have reviewed and signed.     ALLERGIES:    Allergies   Allergen Reactions     Penicillin [Penicillins] Swelling     Sulfa Drugs Nausea and Vomiting     Cefuroxime Nausea and Vomiting     PN: Vomiting     Clindamycin  Rash     Gadolinium Derivatives Itching     PN: Just itching. Gadavist given on 14 for MR Brain WWO exam.        HABITS: Social History    Substance and Sexual Activity      Alcohol use: No     History   Smoking Status     Former Smoker     Packs/day: 1.00     Years: 10.00     Types: Cigarettes     Start date: 6/15/1973     Quit date: 1985   Smokeless Tobacco     Never Used         PAST MEDICAL HISTORY:  Please see today's intake form (for the remainder of the PMH) which I reviewed and signed.  Past Medical History:   Diagnosis Date     Anxiety      Arthritis      Hearing problem      Meniere's disease      Osteoporosis      Tinnitus        FAMILY HISTORY/SOCIAL HISTORY:    Family History   Problem Relation Age of Onset     Cancer Father      Depression Sister      Ear Disorder No family hx of       Social History     Socioeconomic History     Marital status:      Spouse name: Not on file     Number of children: 4     Years of education: Not on file     Highest education level: Not on file   Occupational History     Employer: NONE    Tobacco Use     Smoking status: Former Smoker     Packs/day: 1.00     Years: 10.00     Pack years: 10.00     Types: Cigarettes     Start date: 6/15/1973     Quit date: 1985     Years since quittin.3     Smokeless tobacco: Never Used   Substance and Sexual Activity     Alcohol use: No     Drug use: No     Sexual activity: Yes     Partners: Male     Birth control/protection: None     Comment: hysterectomy   Other Topics Concern     Not on file   Social History Narrative     Not on file     Social Determinants of Health     Financial Resource Strain: Not on file   Food Insecurity: Not on file   Transportation Needs: Not on file   Physical Activity: Not on file   Stress: Not on file   Social Connections: Not on file   Intimate Partner Violence: Not on file   Housing Stability: Not on file       REVIEW OF SYSTEMS: Patient Supplied Answers to Review of Systems  [facility]  ROS 5/17/2022   Constitutional Problems with sleep   Neurology Dizzy spells, Headache   Psychology -   Ears, Nose, Throat Ringing/noise in ears   Cardiopulmonary -   Musculoskeletal -   Hematologic -   Endocrine -            The remainder of the 10 point ROS is negative    PHYSICIAL EXAMINATION:  Constitutional: The patient was well-groomed and in no acute distress.   Skin: Warm and pink.  Psychiatric: The patient's affect was calm, cooperative, and appropriate.   Respiratory: Breathing comfortably without stridor or exertion of accessory muscles.  Eyes: Pupils were equal and reactive. Extraocular movement intact.   Head: Normocephalic and atraumatic. No lesions or scars.  Ears: Patient placed under the microscope for microscopic evaluation and cleaning of cerumen which was obscuring full visualization and complete assessment of both TMs. Under high power magnification, the right ear was examined and cleaned of cerumen using curet, alligator forceps, and suction.  After cleaning, TM is fully visualized and has normal position with normal middle ear aeration. The left ear was then cleaned and inspected using microscope, instruments and similar techniques. After cleaning of cerumen, the TM has normal position with normal aeration to middle ear.  Nose: Sinuses were nontender. Anterior rhinoscopy revealed midline septum and absence of purulence or polyps.  Oral Cavity: Normal tongue, floor of mouth, buccal mucosa, and palate. No abnormal lymph tissue in the oropharynx.   Neck: The parotid is soft without masses. Supple with normal laryngeal and tracheal landmarks.   Lymphatic: There is no palpable lymphadenopathy or other masses in the neck.   Neurologic: Alert and oriented x 3. Cranial nerves III-XI within normal limits. Voice quality normal.  Cerebellar Function Tests:  Grossly normal    Audiogram: Audiogram performed shows normal hearing in the right ear with 100% discrimination.  The left ear shows a moderate  sensorineural hearing loss through the speech frequencies with a increased high-frequency sensorineural hearing loss.  Maintains fairly good discrimination at 92%.    IMPRESSION AND PLAN:   1. Left Ménière's: Discussed this again with her in detail.  She is quite frustrated as she has been having persistent and increasing problems with dizziness since last September.  Has had a negative MRI in the past and symptoms seem consistent with Ménière's with its associated auditory symptoms.  She could not stay for steroid injection today.  We discussed neck steps as endolymphatic shunt operation or consider gentamicin injection.  Initially she was leaning toward the shunt operation.  I did briefly discuss this with her as outpatient surgery success rate etc.  She would like to see one of our surgeons to get their thoughts and recommendations.  She will continue with the diuretic and antihistamine  2. Dizziness: Feel secondary to left Ménière's, treatment as above.  3. Asymmetrical sensorineural hearing loss: Secondary to Ménière's, treatment as above.  4. Left asymmetrical sensorineural hearing loss: Secondary to Ménière's, treatment as above.    Thank you very much for the opportunity to participate in the care of your patient.    Rick L Nissen MD

## 2022-05-17 NOTE — PROGRESS NOTES
AUDIOLOGY REPORT    SUMMARY: Audiology visit completed. See audiogram for results.      RECOMMENDATIONS: Follow-up with ENT.      Neha Jones.  Licensed Audiologist  [place] #[id number]

## 2022-05-17 NOTE — PATIENT INSTRUCTIONS
1. You were seen in the ENT Clinic today by Dr. Nissen.  If you have any questions or concerns after your appointment, please call   - Option 1: ENT Clinic: [phone]   - Option 2: Tracey (Dr. Nissen's Nurse): [phone]                   Emily(Dr. Nissen's Nurse): [phone]      2.   Surgical consult with Dr. Osiel Carroll LPN  [facility] - Otolaryngology

## 2022-08-08 ENCOUNTER — TELEPHONE (OUTPATIENT)
Dept: OTOLARYNGOLOGY | Facility: CLINIC | Age: 67
End: 2022-08-08

## 2022-08-08 NOTE — TELEPHONE ENCOUNTER
LINDA to see what patient would like to do. They were referred by Dr. Nissen to see Dr. Cartagena for a surgical consult. If patient is not ready for that they can still schedule a follow up with Dr. Nissen next available      If Dr. Cartagena- Next available new with [facility] prior    If Dr. Nissen - Gary available return with [facility] and can be on waiting list. He does only work once a week so he does tend to book out

## 2022-08-08 NOTE — TELEPHONE ENCOUNTER
[facility] Call Center    Phone Message    May a detailed message be left on voicemail: yes     Reason for Call: Pt calling to set up follow up visit to Audiology and Dr Nissen for an ear injections. Per protocols, sending high priority encounter to clinic for scheduling. Pt states that she will be leaving in September, please advise.     Action Taken: Message routed to:  [facility] ([facility]): ent    Travel Screening: Not Applicable

## 2022-11-07 DIAGNOSIS — H81.02 MENIERE'S DISEASE, LEFT: ICD-10-CM

## 2022-11-07 DIAGNOSIS — R42 DIZZINESS: Primary | ICD-10-CM

## 2022-11-10 ENCOUNTER — OFFICE VISIT (OUTPATIENT)
Dept: OTOLARYNGOLOGY | Facility: CLINIC | Age: 67
End: 2022-11-10
Payer: COMMERCIAL

## 2022-11-10 ENCOUNTER — OFFICE VISIT (OUTPATIENT)
Dept: AUDIOLOGY | Facility: CLINIC | Age: 67
End: 2022-11-10
Payer: COMMERCIAL

## 2022-11-10 VITALS
OXYGEN SATURATION: 98 % | HEIGHT: 62 IN | BODY MASS INDEX: 22.08 KG/M2 | TEMPERATURE: 98.5 F | WEIGHT: 120 LBS | SYSTOLIC BLOOD PRESSURE: 121 MMHG | HEART RATE: 82 BPM | DIASTOLIC BLOOD PRESSURE: 89 MMHG

## 2022-11-10 DIAGNOSIS — H90.3 ASYMMETRICAL SENSORINEURAL HEARING LOSS: ICD-10-CM

## 2022-11-10 DIAGNOSIS — H81.02 MENIERE'S DISEASE, LEFT: Primary | ICD-10-CM

## 2022-11-10 DIAGNOSIS — H90.3 ASYMMETRICAL SENSORINEURAL HEARING LOSS: Primary | ICD-10-CM

## 2022-11-10 PROCEDURE — 92550 TYMPANOMETRY & REFLEX THRESH: CPT | Performed by: AUDIOLOGIST

## 2022-11-10 PROCEDURE — 99214 OFFICE O/P EST MOD 30 MIN: CPT | Mod: 25 | Performed by: OTOLARYNGOLOGY

## 2022-11-10 PROCEDURE — 69210 REMOVE IMPACTED EAR WAX UNI: CPT | Mod: GC | Performed by: OTOLARYNGOLOGY

## 2022-11-10 PROCEDURE — 92557 COMPREHENSIVE HEARING TEST: CPT | Performed by: AUDIOLOGIST

## 2022-11-10 NOTE — LETTER
11/10/2022       RE: Madelin Story  [address] [place] [address]     Dear Colleague,    Thank you for referring your patient, Madelin Story, to the [facility] [place] [facility]. Please see a copy of my visit note below.      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 She was last seen by Dr. Nissen on 5/17/2022 and they discussed endolymphatic sac decompression vs gentamicin injection.      She first noticed left sided sudden drop in hearing after blowing her nose 6-7 years ago. She started having left sided fullness and tinnitus since then that occurs with vertigo. Her left hearing has stayed about the same since then. She used to use a hearing aid in left ear which helped but the hearing aid stopped working. She now has vertigo lasting about 20 min to 1 hour several times a week. She was diagnosed with left sided Meniere's disease and has been followed by Dr. Nissen. She is on low salt diet and diuretics. She had about 6-8 prior intratympanic steroid injections. It used to work to control her dizziness and fullness but it does not help as much anymore. Her last injection was in December of last year. She is interested in learning more about next management options.     She also has a baseline headache which is not associated with her dizziness. She endorses photophobia and phonophobia with her headache but no auras. She does limit caffeine intake in general. Also takes Lyrica and Effexor for fibromyalgia. She thinks she may have had an MRI brain done at [facility] many years ago  but unsure what it was for. This was reportedly normal.     Active Medications:     Current Outpatient Medications:      amLODIPine (NORVASC) 5 MG tablet, Take 5 mg by mouth, Disp: , Rfl:      clonazePAM (KLONOPIN) 0.5 MG tablet, Take 0.5 mg by mouth, Disp: , Rfl:      cyclobenzaprine (FLEXERIL) 10 MG tablet, Take 0.5 tablets (5 mg) by mouth At Bedtime, Disp: 5 tablet, Rfl: 0     cyclobenzaprine (FLEXERIL) 5 MG tablet, , Disp: , Rfl:      diclofenac (VOLTAREN) 1 % topical gel, Place 2 g onto the skin, Disp: , Rfl:      diphenhydrAMINE (BENADRYL) 25 MG tablet, Take 25 mg by mouth, Disp: , Rfl:      estradiol (VAGIFEM) 10 MCG TABS vaginal tablet, Place 10 mcg vaginally, Disp: , Rfl:      fentaNYL, PF, (SUBLIMAZE) 100 MCG/2ML injection, Inject  mcg into the vein, Disp: , Rfl:      hydrochlorothiazide (HYDRODIURIL) 25 MG tablet, Take 25 mg by mouth, Disp: , Rfl:      HYDROcodone-acetaminophen (NORCO) 5-325 MG tablet, Take 1-2 tablets by mouth, Disp: , Rfl:      IBUPROFEN 200 MG OR TABS, , Disp: 120, Rfl: 0     lidocaine (LIDODERM) 5 % Patch, PLACE 1 PATCH ONTO THE SKIN EVERY 24 HOURS. (ON 12 AND OFF FOR 12 HRS), Disp: , Rfl:      meloxicam (MOBIC) 7.5 MG tablet, Take 1 tablet by mouth daily, Disp: , Rfl:      meloxicam (MOBIC) 7.5 MG tablet, , Disp: , Rfl:      midazolam (VERSED) 2 MG/2ML injection, Inject 1-2 mg into the vein, Disp: , Rfl:      niacin 500 MG tablet, Take by mouth daily (with breakfast), Disp: , Rfl:      oxyCODONE-acetaminophen (PERCOCET) 5-325 MG tablet, , Disp: , Rfl:      PAXLOVID therapy pack, , Disp: , Rfl:      pregabalin (LYRICA) 75 MG capsule, Take 75 mg by mouth, Disp: , Rfl:      pregabalin (LYRICA) 75 MG capsule, Take 75 mg by mouth, Disp: , Rfl:      PREVIDENT 5000 DRY MOUTH 1.1 % GEL topical gel, , Disp: , Rfl:      traMADol (ULTRAM) 50 MG tablet, Take 50 mg by mouth, Disp: , Rfl:      TRAZODONE HCL 50 MG OR TABS, 1 1/2 Q HS, Disp: 3 MONTHS, Rfl: 1 YEAR     venlafaxine (EFFEXOR-ER)  225 MG TB24 24 hr tablet, Take 225 mg by mouth Pt reports taking 150mg and a 75mg tablet once daily., Disp: , Rfl:       Allergies:   Penicillin [penicillins], Sulfa drugs, Cefuroxime, Clindamycin, and Gadolinium derivatives      Past Medical History:  Past Medical History:   Diagnosis Date     Anxiety      Arthritis      Hearing problem      Meniere's disease      Osteoporosis      Tinnitus      Patient Active Problem List   Diagnosis     Localized osteoarthrosis, upper arm     Symptomatic menopausal or female climacteric states     Disturbance in sleep behavior     Headache     Cervicalgia     Other symptoms involving urinary system     CARDIOVASCULAR SCREENING; LDL GOAL LESS THAN 160        Past Surgical History:  Past Surgical History:   Procedure Laterality Date     GENITOURINARY SURGERY       GYN SURGERY       PE TUBES       SURGICAL HISTORY OF -   05    Diagnostic Lap 2nd to pelvic pain     TONSILLECTOMY         Family History:   Family History   Problem Relation Age of Onset     Cancer Father      Depression Sister      Ear Disorder No family hx of          Social History:   Social History     Tobacco Use     Smoking status: Former     Packs/day: 1.00     Years: 10.00     Pack years: 10.00     Types: Cigarettes     Start date: 6/15/1973     Quit date: 1985     Years since quittin.8     Smokeless tobacco: Never   Substance Use Topics     Alcohol use: No     Drug use: No       Review of Systems:   Pertinent items are noted in HPI or as in patient entered ROS below, remainder of complete ROS is negative.    ENT ROS 2022   Constitutional: Problems with sleep   Neurology: Dizzy spells, Headache   Psychology: -   Ears, Nose, Throat: Ringing/noise in ears   Cardiopulmonary: -   Musculoskeletal: -   Hematologic: -   Endocrine: -        Physical examination:  Constitutional:  In no acute distress, appears stated age  Eyes:  Extraocular movements intact, no spontaneous nystagmus  Ears:  Both ears  examined under the microscope.    - Right: Ear canal with mild cerumen impaction, debrided. TM intact and middle ear aerated.   - Left: Ear canal with mild cerumen impaction, debrided. TM intact with dimeric area posteriorly. Middle ear aerated.   Respiratory:  No increased work of breathing, wheezing or stridor  Musculoskeletal:  Good upper extremity strength  Skin:  No rashes on the head and neck  Neurologic:  House Brackman 1/6 bilaterally, ambulating normally  Psychiatric:  Alert, normal affect, answering questions appropriately    Audiogram:  AUDIOGRAM: She underwent an audiogram today. This demonstrated: right normal hearing, left mild downsloping to severe sensorineural hearing loss         Right: Speech reception threshold is 15 dB with 100% word recognition. Tympanogram A type   Left: Speech reception threshold is 50 dB with 92% word recognition. Tympanogram A type     Audiogram was independently reviewed and compared to multiple prior audiograms dating back to 2018. She had poor hearing initially in 8/2018 which improved and was essentially normal by 10/2018 and stayed stable through the rest of 2018. However, in 2021, her left hearing had declined to the level it is today and has stayed stable.    Assessment and Plan:  Madelin Story is a 67 year old female with a history of left Meniere's disease presenting to discuss intratympanic gentamycin injection vs endolymphatic sac decompression. We reviewed her symptoms and audiogram in detail. Her symptoms along with left sided mixed hearing loss are suggestive of Meniere's disease. She has been having more frequent vertigo in the past 6 months that are refractory to low salt diet, diuretics, and IT steroid injections. We recommended VNG and ECOG to assess her baseline vestibular function. She has her VNG scheduled next Monday. We will obtain MRI imaging record from [facility] given her asymmetrical hearing loss.     We reviewed risks and benefits of both  options with the patient. IT gentamycin injection has risks of worsening hearing loss, dizziness, and tympanic membrane perforation. The risks of endolymphatic sac decompression were discussed. The risks include but are not limited to:  Worsened hearing which may require further surgery, profound and irreversible hearing loss, dizziness, damage to the taste nerve, damage to the facial nerve, persistent tympanic membrane perforation requiring further surgery and infection.  Postoperative restrictions were discussed. We reviewed that both options are designed for control of dizzy symptoms and her tinnitus and fullness may not improve.     Patient will consider both options and we will discuss further after her VNG and ECOG testings are completed. The patient expressed understanding and is in agreement with this plan.  All questions were answered.    Follow-up:   - VNG and ECOG as scheduled  - obtain MRI results from [facility]   - Candidate for IT gentamicin injection vs left endolymphatic sac decompression      Lexi Rashid MD MPH   Fellow Physician  Otology & Neurotology  [facility]     Scribe Preparation Attestation:  I, Jonathan Buchanan, a scribe, prepared the chart for today's encounter.    The documentation recorded by the scribe accurately reflects the services I personally performed and the decisions made by me.    I, Barbra Cartagena MD, saw this patient with the resident/fellow and agree with the resident/fellow s findings and plan of care as documented in the resident s/fellow s note. I was present for the entire procedure.    Barbra Cartagena MD

## 2022-11-10 NOTE — PATIENT INSTRUCTIONS
You were seen in the ENT Clinic today by Dr. Cartagena. If you have any questions or concerns after your appointment, please contact us (see below)      2.   We will call you with the results of your VNG and schedule follow-up appointments accordingly.        How to Contact Us:  Send a Distra message to your provider. Our team will respond to you via Distra. Occasionally, we will need to call you to get further information.  For urgent matters (Monday-Friday), call the ENT Clinic: [phone] and speak with a call center team member - they will route your call appropriately.   If you'd like to speak directly with a nurse, please find our contact information below. We do our best to check voicemail frequently throughout the day, and will work to call you back within 1-2 days. For urgent matters, please use the general clinic phone numbers listed above.      Kaylee RIDER RN  ENT RN Care Coordinator  Direct: [phone]    Tracey WRIGHT LPN  Direct: [phone]

## 2022-11-10 NOTE — NURSING NOTE
"Chief Complaint   Patient presents with     RECHECK     Surgical consult      Blood pressure 121/89, pulse 82, temperature 98.5  F (36.9  C), height 1.575 m (5' 2\"), weight 54.4 kg (120 lb), SpO2 98 %.    Smooth Brewster LPN    "

## 2022-11-10 NOTE — PROGRESS NOTES
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 She was last seen by Dr. Nissen on 5/17/2022 and they discussed endolymphatic sac decompression vs gentamicin injection.      She first noticed left sided sudden drop in hearing after blowing her nose 6-7 years ago. She started having left sided fullness and tinnitus since then that occurs with vertigo. Her left hearing has stayed about the same since then. She used to use a hearing aid in left ear which helped but the hearing aid stopped working. She now has vertigo lasting about 20 min to 1 hour several times a week. She was diagnosed with left sided Meniere's disease and has been followed by Dr. Nissen. She is on low salt diet and diuretics. She had about 6-8 prior intratympanic steroid injections. It used to work to control her dizziness and fullness but it does not help as much anymore. Her last injection was in December of last year. She is interested in learning more about next management options.     She also has a baseline headache which is not associated with her dizziness. She endorses photophobia and phonophobia with her headache but no auras. She does limit caffeine intake in general. Also takes Lyrica and Effexor for fibromyalgia. She thinks she may have had an MRI brain done at [facility] many years ago but unsure what it was for. This was reportedly normal.     Active Medications:     Current Outpatient Medications:      amLODIPine (NORVASC) 5 MG tablet, Take 5 mg by mouth, Disp: , Rfl:      clonazePAM (KLONOPIN) 0.5 MG tablet, Take 0.5 mg by mouth, Disp: , Rfl:      cyclobenzaprine (FLEXERIL) 10 MG tablet, Take 0.5  tablets (5 mg) by mouth At Bedtime, Disp: 5 tablet, Rfl: 0     cyclobenzaprine (FLEXERIL) 5 MG tablet, , Disp: , Rfl:      diclofenac (VOLTAREN) 1 % topical gel, Place 2 g onto the skin, Disp: , Rfl:      diphenhydrAMINE (BENADRYL) 25 MG tablet, Take 25 mg by mouth, Disp: , Rfl:      estradiol (VAGIFEM) 10 MCG TABS vaginal tablet, Place 10 mcg vaginally, Disp: , Rfl:      fentaNYL, PF, (SUBLIMAZE) 100 MCG/2ML injection, Inject  mcg into the vein, Disp: , Rfl:      hydrochlorothiazide (HYDRODIURIL) 25 MG tablet, Take 25 mg by mouth, Disp: , Rfl:      HYDROcodone-acetaminophen (NORCO) 5-325 MG tablet, Take 1-2 tablets by mouth, Disp: , Rfl:      IBUPROFEN 200 MG OR TABS, , Disp: 120, Rfl: 0     lidocaine (LIDODERM) 5 % Patch, PLACE 1 PATCH ONTO THE SKIN EVERY 24 HOURS. (ON 12 AND OFF FOR 12 HRS), Disp: , Rfl:      meloxicam (MOBIC) 7.5 MG tablet, Take 1 tablet by mouth daily, Disp: , Rfl:      meloxicam (MOBIC) 7.5 MG tablet, , Disp: , Rfl:      midazolam (VERSED) 2 MG/2ML injection, Inject 1-2 mg into the vein, Disp: , Rfl:      niacin 500 MG tablet, Take by mouth daily (with breakfast), Disp: , Rfl:      oxyCODONE-acetaminophen (PERCOCET) 5-325 MG tablet, , Disp: , Rfl:      PAXLOVID therapy pack, , Disp: , Rfl:      pregabalin (LYRICA) 75 MG capsule, Take 75 mg by mouth, Disp: , Rfl:      pregabalin (LYRICA) 75 MG capsule, Take 75 mg by mouth, Disp: , Rfl:      PREVIDENT 5000 DRY MOUTH 1.1 % GEL topical gel, , Disp: , Rfl:      traMADol (ULTRAM) 50 MG tablet, Take 50 mg by mouth, Disp: , Rfl:      TRAZODONE HCL 50 MG OR TABS, 1 1/2 Q HS, Disp: 3 MONTHS, Rfl: 1 YEAR     venlafaxine (EFFEXOR-ER) 225 MG TB24 24 hr tablet, Take 225 mg by mouth Pt reports taking 150mg and a 75mg tablet once daily., Disp: , Rfl:       Allergies:   Penicillin [penicillins], Sulfa drugs, Cefuroxime, Clindamycin, and Gadolinium derivatives      Past Medical History:  Past Medical History:   Diagnosis Date     Anxiety      Arthritis       Hearing problem      Meniere's disease      Osteoporosis      Tinnitus      Patient Active Problem List   Diagnosis     Localized osteoarthrosis, upper arm     Symptomatic menopausal or female climacteric states     Disturbance in sleep behavior     Headache     Cervicalgia     Other symptoms involving urinary system     CARDIOVASCULAR SCREENING; LDL GOAL LESS THAN 160        Past Surgical History:  Past Surgical History:   Procedure Laterality Date     GENITOURINARY SURGERY       GYN SURGERY       PE TUBES       SURGICAL HISTORY OF -   05    Diagnostic Lap 2nd to pelvic pain     TONSILLECTOMY         Family History:   Family History   Problem Relation Age of Onset     Cancer Father      Depression Sister      Ear Disorder No family hx of          Social History:   Social History     Tobacco Use     Smoking status: Former     Packs/day: 1.00     Years: 10.00     Pack years: 10.00     Types: Cigarettes     Start date: 6/15/1973     Quit date: 1985     Years since quittin.8     Smokeless tobacco: Never   Substance Use Topics     Alcohol use: No     Drug use: No       Review of Systems:   Pertinent items are noted in HPI or as in patient entered ROS below, remainder of complete ROS is negative.    ENT ROS 2022   Constitutional: Problems with sleep   Neurology: Dizzy spells, Headache   Psychology: -   Ears, Nose, Throat: Ringing/noise in ears   Cardiopulmonary: -   Musculoskeletal: -   Hematologic: -   Endocrine: -        Physical examination:  Constitutional:  In no acute distress, appears stated age  Eyes:  Extraocular movements intact, no spontaneous nystagmus  Ears:  Both ears examined under the microscope.    - Right: Ear canal with mild cerumen impaction, debrided. TM intact and middle ear aerated.   - Left: Ear canal with mild cerumen impaction, debrided. TM intact with dimeric area posteriorly. Middle ear aerated.   Respiratory:  No increased work of breathing, wheezing or  stridor  Musculoskeletal:  Good upper extremity strength  Skin:  No rashes on the head and neck  Neurologic:  House Brackman 1/6 bilaterally, ambulating normally  Psychiatric:  Alert, normal affect, answering questions appropriately    Audiogram:  AUDIOGRAM: She underwent an audiogram today. This demonstrated: right normal hearing, left mild downsloping to severe sensorineural hearing loss         Right: Speech reception threshold is 15 dB with 100% word recognition. Tympanogram A type   Left: Speech reception threshold is 50 dB with 92% word recognition. Tympanogram A type     Audiogram was independently reviewed and compared to multiple prior audiograms dating back to 2018. She had poor hearing initially in 8/2018 which improved and was essentially normal by 10/2018 and stayed stable through the rest of 2018. However, in 2021, her left hearing had declined to the level it is today and has stayed stable.    Assessment and Plan:  Madelin Story is a 67 year old female with a history of left Meniere's disease presenting to discuss intratympanic gentamycin injection vs endolymphatic sac decompression. We reviewed her symptoms and audiogram in detail. Her symptoms along with left sided mixed hearing loss are suggestive of Meniere's disease. She has been having more frequent vertigo in the past 6 months that are refractory to low salt diet, diuretics, and IT steroid injections. We recommended VNG and ECOG to assess her baseline vestibular function. She has her VNG scheduled next Monday. We will obtain MRI imaging record from [facility] given her asymmetrical hearing loss.     We reviewed risks and benefits of both options with the patient. IT gentamycin injection has risks of worsening hearing loss, dizziness, and tympanic membrane perforation. The risks of endolymphatic sac decompression were discussed. The risks include but are not limited to:  Worsened hearing which may require further surgery, profound and  irreversible hearing loss, dizziness, damage to the taste nerve, damage to the facial nerve, persistent tympanic membrane perforation requiring further surgery and infection.  Postoperative restrictions were discussed. We reviewed that both options are designed for control of dizzy symptoms and her tinnitus and fullness may not improve.     Patient will consider both options and we will discuss further after her VNG and ECOG testings are completed. The patient expressed understanding and is in agreement with this plan.  All questions were answered.    Follow-up:   - VNG and ECOG as scheduled  - obtain MRI results from [facility]   - Candidate for IT gentamicin injection vs left endolymphatic sac decompression      Lexi Rashid MD MPH   Fellow Physician  Otology & Neurotology  [facility]     Scribe Preparation Attestation:  I, Jonathan Buchanan, a scribe, prepared the chart for today's encounter.    The documentation recorded by the scribe accurately reflects the services I personally performed and the decisions made by me.    I, Barbra Cartagena MD, saw this patient with the resident/fellow and agree with the resident/fellow s findings and plan of care as documented in the resident s/fellow s note. I was present for the entire procedure.    Barbra Cartagena MD

## 2022-11-10 NOTE — PROGRESS NOTES
AUDIOLOGY REPORT    SUMMARY: Audiology visit completed. See audiogram for results.      RECOMMENDATIONS: Follow-up with ENT.    Neha Aldana. CCC-A  Licensed Audiologist   [place] #[id number]

## 2022-11-15 ENCOUNTER — TELEPHONE (OUTPATIENT)
Dept: AUDIOLOGY | Facility: CLINIC | Age: 67
End: 2022-11-15

## 2022-11-23 ENCOUNTER — TELEPHONE (OUTPATIENT)
Dept: AUDIOLOGY | Facility: CLINIC | Age: 67
End: 2022-11-23

## 2022-11-23 NOTE — TELEPHONE ENCOUNTER
[facility] Call Center    Phone Message    May a detailed message be left on voicemail: no     Reason for Call: Appointment Intake    Referring Provider Name: Barbra Cartagena MD  Diagnosis and/or Symptoms: Balance Referral: Videonystagmography (VNG) with [facility]    Sending to clinic per protocols    Action Taken: Message routed to:  Other: ENT    Travel Screening: Not Applicable

## 2023-03-27 RX ORDER — VENLAFAXINE HYDROCHLORIDE 150 MG/1
1 CAPSULE, EXTENDED RELEASE ORAL DAILY
COMMUNITY
Start: 2022-06-30 | End: 2023-03-27

## 2023-03-27 RX ORDER — MULTIVITAMIN WITH IRON
1 TABLET ORAL DAILY
COMMUNITY

## 2023-03-27 RX ORDER — TRAZODONE HYDROCHLORIDE 150 MG/1
225 TABLET ORAL
COMMUNITY
Start: 2022-06-30 | End: 2023-03-27

## 2023-03-27 RX ORDER — VENLAFAXINE HYDROCHLORIDE 75 MG/1
1 CAPSULE, EXTENDED RELEASE ORAL DAILY
COMMUNITY
Start: 2022-08-09 | End: 2023-03-27

## 2023-04-02 NOTE — PHARMACY-ADMISSION MEDICATION HISTORY
Medication history and patient interview completed by pharmacy intern/student or pre-admitting RN.  Reviewed by pharmacist, including [employer] dispense records, [facility] Care Everywhere, and chart review.       Burak Guidry, Pharm.D., BCPS      Status Changed by Time of change   Nurse Tristan Banks, NEETA Mon Mar 27, 2023 12:31 PM       Prior to Admission medications    Medication Sig Last Dose Taking? Auth Provider Long Term End Date   amLODIPine (NORVASC) 5 MG tablet Take 5 mg by mouth daily  Yes Reported, Patient Yes    clonazePAM (KLONOPIN) 0.5 MG tablet Take 0.5 mg by mouth 2 times daily  Yes Reported, Patient Yes    diclofenac (VOLTAREN) 1 % topical gel Apply 2 g topically 2 times daily  Yes Reported, Patient     diphenhydrAMINE (BENADRYL) 25 MG tablet Take 25 mg by mouth nightly as needed  Yes Reported, Patient     estradiol (VAGIFEM) 10 MCG TABS vaginal tablet Place 10 mcg vaginally twice a week  Yes Reported, Patient     hydrochlorothiazide (HYDRODIURIL) 25 MG tablet Take 25 mg by mouth daily  Yes Reported, Patient Yes    magnesium 250 MG tablet Take 1 tablet by mouth daily prn  Yes Reported, Patient     meloxicam (MOBIC) 7.5 MG tablet Take 7.5 mg by mouth daily  Yes Reported, Patient Yes    niacin 500 MG tablet Take by mouth daily (with breakfast)  Yes Reported, Patient     pregabalin (LYRICA) 75 MG capsule Take 75 mg by mouth 3 times daily  Yes Reported, Patient Yes    PREVIDENT 5000 DRY MOUTH 1.1 % GEL topical gel   Yes Reported, Patient     traMADol (ULTRAM) 50 MG tablet Take 50 mg by mouth 4 times daily as needed for pain  Yes Reported, Patient     traZODone (DESYREL) 150 MG tablet Take 1.5 tablets by mouth At Bedtime  Yes Ambrose Jacob MD     venlafaxine (EFFEXOR-ER) 225 MG TB24 24 hr tablet Take 225 mg by mouth daily  Yes Reported, Patient Yes

## 2023-04-04 ENCOUNTER — APPOINTMENT (OUTPATIENT)
Dept: GENERAL RADIOLOGY | Facility: CLINIC | Age: 68
End: 2023-04-04
Attending: ORTHOPAEDIC SURGERY
Payer: COMMERCIAL

## 2023-04-04 ENCOUNTER — ANESTHESIA EVENT (OUTPATIENT)
Dept: SURGERY | Facility: CLINIC | Age: 68
End: 2023-04-04
Payer: COMMERCIAL

## 2023-04-04 ENCOUNTER — HOSPITAL ENCOUNTER (OUTPATIENT)
Facility: CLINIC | Age: 68
Discharge: HOME | End: 2023-04-05
Attending: ORTHOPAEDIC SURGERY | Admitting: ORTHOPAEDIC SURGERY
Payer: COMMERCIAL

## 2023-04-04 ENCOUNTER — ANESTHESIA (OUTPATIENT)
Dept: SURGERY | Facility: CLINIC | Age: 68
End: 2023-04-04
Payer: COMMERCIAL

## 2023-04-04 DIAGNOSIS — M19.011 PRIMARY OSTEOARTHRITIS OF RIGHT SHOULDER: Primary | ICD-10-CM

## 2023-04-04 PROCEDURE — 250N000013 HC RX MED GY IP 250 OP 250 PS 637: Performed by: PHYSICIAN ASSISTANT

## 2023-04-04 PROCEDURE — C1713 ANCHOR/SCREW BN/BN,TIS/BN: HCPCS | Performed by: ORTHOPAEDIC SURGERY

## 2023-04-04 PROCEDURE — 250N000011 HC RX IP 250 OP 636: Performed by: ANESTHESIOLOGY

## 2023-04-04 PROCEDURE — 250N000009 HC RX 250: Performed by: ANESTHESIOLOGY

## 2023-04-04 PROCEDURE — 250N000011 HC RX IP 250 OP 636: Performed by: ORTHOPAEDIC SURGERY

## 2023-04-04 PROCEDURE — 272N000002 HC OR SUPPLY OTHER OPNP: Performed by: ORTHOPAEDIC SURGERY

## 2023-04-04 PROCEDURE — 250N000011 HC RX IP 250 OP 636: Performed by: PHYSICIAN ASSISTANT

## 2023-04-04 PROCEDURE — 258N000003 HC RX IP 258 OP 636: Performed by: ANESTHESIOLOGY

## 2023-04-04 PROCEDURE — 999N000179 XR SURGERY CARM FLUORO LESS THAN 5 MIN W STILLS: Mod: TC

## 2023-04-04 PROCEDURE — 258N000001 HC RX 258: Performed by: ORTHOPAEDIC SURGERY

## 2023-04-04 PROCEDURE — C1776 JOINT DEVICE (IMPLANTABLE): HCPCS | Performed by: ORTHOPAEDIC SURGERY

## 2023-04-04 PROCEDURE — 272N000001 HC OR GENERAL SUPPLY STERILE: Performed by: ORTHOPAEDIC SURGERY

## 2023-04-04 PROCEDURE — 250N000009 HC RX 250: Performed by: ORTHOPAEDIC SURGERY

## 2023-04-04 PROCEDURE — 250N000011 HC RX IP 250 OP 636: Performed by: NURSE ANESTHETIST, CERTIFIED REGISTERED

## 2023-04-04 PROCEDURE — 710N000009 HC RECOVERY PHASE 1, LEVEL 1, PER MIN: Performed by: ORTHOPAEDIC SURGERY

## 2023-04-04 PROCEDURE — 258N000003 HC RX IP 258 OP 636: Performed by: NURSE ANESTHETIST, CERTIFIED REGISTERED

## 2023-04-04 PROCEDURE — 250N000025 HC SEVOFLURANE, PER MIN: Performed by: ORTHOPAEDIC SURGERY

## 2023-04-04 PROCEDURE — 250N000009 HC RX 250: Performed by: NURSE ANESTHETIST, CERTIFIED REGISTERED

## 2023-04-04 PROCEDURE — 360N000084 HC SURGERY LEVEL 4 W/ FLUORO, PER MIN: Performed by: ORTHOPAEDIC SURGERY

## 2023-04-04 PROCEDURE — 999N000141 HC STATISTIC PRE-PROCEDURE NURSING ASSESSMENT: Performed by: ORTHOPAEDIC SURGERY

## 2023-04-04 PROCEDURE — 370N000017 HC ANESTHESIA TECHNICAL FEE, PER MIN: Performed by: ORTHOPAEDIC SURGERY

## 2023-04-04 DEVICE — IMPLANTABLE DEVICE: Type: IMPLANTABLE DEVICE | Site: SHOULDER | Status: FUNCTIONAL

## 2023-04-04 DEVICE — IMPLANTABLE DEVICE
Type: IMPLANTABLE DEVICE | Site: SHOULDER | Status: FUNCTIONAL
Brand: EQUINOXE

## 2023-04-04 RX ORDER — ASPIRIN 81 MG/1
81 TABLET ORAL 2 TIMES DAILY
Status: DISCONTINUED | OUTPATIENT
Start: 2023-04-04 | End: 2023-04-05 | Stop reason: HOSPADM

## 2023-04-04 RX ORDER — LIDOCAINE 40 MG/G
CREAM TOPICAL
Status: DISCONTINUED | OUTPATIENT
Start: 2023-04-04 | End: 2023-04-04 | Stop reason: HOSPADM

## 2023-04-04 RX ORDER — FENTANYL CITRATE 50 UG/ML
25 INJECTION, SOLUTION INTRAMUSCULAR; INTRAVENOUS EVERY 5 MIN PRN
Status: DISCONTINUED | OUTPATIENT
Start: 2023-04-04 | End: 2023-04-04 | Stop reason: HOSPADM

## 2023-04-04 RX ORDER — VENLAFAXINE HYDROCHLORIDE 75 MG/1
225 CAPSULE, EXTENDED RELEASE ORAL DAILY
Status: DISCONTINUED | OUTPATIENT
Start: 2023-04-05 | End: 2023-04-05 | Stop reason: HOSPADM

## 2023-04-04 RX ORDER — ACETAMINOPHEN 325 MG/1
975 TABLET ORAL ONCE
Status: COMPLETED | OUTPATIENT
Start: 2023-04-04 | End: 2023-04-04

## 2023-04-04 RX ORDER — POLYETHYLENE GLYCOL 3350 17 G/17G
17 POWDER, FOR SOLUTION ORAL DAILY
Status: DISCONTINUED | OUTPATIENT
Start: 2023-04-05 | End: 2023-04-05 | Stop reason: HOSPADM

## 2023-04-04 RX ORDER — HYDROCHLOROTHIAZIDE 25 MG/1
25 TABLET ORAL DAILY
Status: DISCONTINUED | OUTPATIENT
Start: 2023-04-05 | End: 2023-04-05 | Stop reason: HOSPADM

## 2023-04-04 RX ORDER — MELOXICAM 7.5 MG/1
7.5 TABLET ORAL DAILY
Status: DISCONTINUED | OUTPATIENT
Start: 2023-04-05 | End: 2023-04-05 | Stop reason: HOSPADM

## 2023-04-04 RX ORDER — AMOXICILLIN 250 MG
1-2 CAPSULE ORAL 2 TIMES DAILY
Qty: 30 TABLET | Refills: 0 | Status: SHIPPED | OUTPATIENT
Start: 2023-04-04

## 2023-04-04 RX ORDER — CELECOXIB 200 MG/1
400 CAPSULE ORAL ONCE
Status: COMPLETED | OUTPATIENT
Start: 2023-04-04 | End: 2023-04-04

## 2023-04-04 RX ORDER — SODIUM CHLORIDE, SODIUM LACTATE, POTASSIUM CHLORIDE, CALCIUM CHLORIDE 600; 310; 30; 20 MG/100ML; MG/100ML; MG/100ML; MG/100ML
INJECTION, SOLUTION INTRAVENOUS CONTINUOUS
Status: DISCONTINUED | OUTPATIENT
Start: 2023-04-04 | End: 2023-04-04 | Stop reason: HOSPADM

## 2023-04-04 RX ORDER — HYDROMORPHONE HCL IN WATER/PF 6 MG/30 ML
0.2 PATIENT CONTROLLED ANALGESIA SYRINGE INTRAVENOUS
Status: DISCONTINUED | OUTPATIENT
Start: 2023-04-04 | End: 2023-04-05 | Stop reason: HOSPADM

## 2023-04-04 RX ORDER — OXYCODONE HYDROCHLORIDE 10 MG/1
10 TABLET ORAL EVERY 4 HOURS PRN
Status: DISCONTINUED | OUTPATIENT
Start: 2023-04-04 | End: 2023-04-05 | Stop reason: HOSPADM

## 2023-04-04 RX ORDER — LIDOCAINE HYDROCHLORIDE 20 MG/ML
INJECTION, SOLUTION INFILTRATION; PERINEURAL PRN
Status: DISCONTINUED | OUTPATIENT
Start: 2023-04-04 | End: 2023-04-04

## 2023-04-04 RX ORDER — HYDROMORPHONE HCL IN WATER/PF 6 MG/30 ML
0.4 PATIENT CONTROLLED ANALGESIA SYRINGE INTRAVENOUS
Status: DISCONTINUED | OUTPATIENT
Start: 2023-04-04 | End: 2023-04-05 | Stop reason: HOSPADM

## 2023-04-04 RX ORDER — ACETAMINOPHEN 325 MG/1
650 TABLET ORAL EVERY 4 HOURS PRN
Status: DISCONTINUED | OUTPATIENT
Start: 2023-04-07 | End: 2023-04-05 | Stop reason: HOSPADM

## 2023-04-04 RX ORDER — OXYCODONE HYDROCHLORIDE 5 MG/1
5-10 TABLET ORAL EVERY 4 HOURS PRN
Qty: 25 TABLET | Refills: 0 | Status: SHIPPED | OUTPATIENT
Start: 2023-04-04

## 2023-04-04 RX ORDER — ACETAMINOPHEN 325 MG/1
650 TABLET ORAL EVERY 4 HOURS PRN
Qty: 100 TABLET | Refills: 0 | Status: SHIPPED | OUTPATIENT
Start: 2023-04-04

## 2023-04-04 RX ORDER — VANCOMYCIN HYDROCHLORIDE 1 G/20ML
INJECTION, POWDER, LYOPHILIZED, FOR SOLUTION INTRAVENOUS PRN
Status: DISCONTINUED | OUTPATIENT
Start: 2023-04-04 | End: 2023-04-04 | Stop reason: HOSPADM

## 2023-04-04 RX ORDER — PROPOFOL 10 MG/ML
INJECTION, EMULSION INTRAVENOUS CONTINUOUS PRN
Status: DISCONTINUED | OUTPATIENT
Start: 2023-04-04 | End: 2023-04-04

## 2023-04-04 RX ORDER — AMLODIPINE BESYLATE 5 MG/1
5 TABLET ORAL DAILY
Status: DISCONTINUED | OUTPATIENT
Start: 2023-04-05 | End: 2023-04-05 | Stop reason: HOSPADM

## 2023-04-04 RX ORDER — CLONAZEPAM 0.5 MG/1
0.5 TABLET ORAL 2 TIMES DAILY
Status: DISCONTINUED | OUTPATIENT
Start: 2023-04-04 | End: 2023-04-05 | Stop reason: HOSPADM

## 2023-04-04 RX ORDER — ASPIRIN 81 MG/1
81 TABLET ORAL 2 TIMES DAILY
Qty: 60 TABLET | Refills: 0 | Status: SHIPPED | OUTPATIENT
Start: 2023-04-04

## 2023-04-04 RX ORDER — GLYCOPYRROLATE 0.2 MG/ML
INJECTION, SOLUTION INTRAMUSCULAR; INTRAVENOUS PRN
Status: DISCONTINUED | OUTPATIENT
Start: 2023-04-04 | End: 2023-04-04

## 2023-04-04 RX ORDER — OXYCODONE HYDROCHLORIDE 5 MG/1
5 TABLET ORAL EVERY 4 HOURS PRN
Status: DISCONTINUED | OUTPATIENT
Start: 2023-04-04 | End: 2023-04-05 | Stop reason: HOSPADM

## 2023-04-04 RX ORDER — TRANEXAMIC ACID 650 MG/1
1950 TABLET ORAL ONCE
Status: COMPLETED | OUTPATIENT
Start: 2023-04-04 | End: 2023-04-04

## 2023-04-04 RX ORDER — ACETAMINOPHEN 160 MG
TABLET,DISINTEGRATING ORAL PRN
Status: DISCONTINUED | OUTPATIENT
Start: 2023-04-04 | End: 2023-04-04 | Stop reason: HOSPADM

## 2023-04-04 RX ORDER — HYDROXYZINE HYDROCHLORIDE 25 MG/1
25 TABLET, FILM COATED ORAL EVERY 6 HOURS PRN
Qty: 25 TABLET | Refills: 0 | Status: SHIPPED | OUTPATIENT
Start: 2023-04-04

## 2023-04-04 RX ORDER — ONDANSETRON 2 MG/ML
4 INJECTION INTRAMUSCULAR; INTRAVENOUS EVERY 30 MIN PRN
Status: DISCONTINUED | OUTPATIENT
Start: 2023-04-04 | End: 2023-04-04 | Stop reason: HOSPADM

## 2023-04-04 RX ORDER — BUPIVACAINE HYDROCHLORIDE AND EPINEPHRINE 2.5; 5 MG/ML; UG/ML
INJECTION, SOLUTION INFILTRATION; PERINEURAL
Status: COMPLETED | OUTPATIENT
Start: 2023-04-04 | End: 2023-04-04

## 2023-04-04 RX ORDER — ACETAMINOPHEN 325 MG/1
975 TABLET ORAL EVERY 8 HOURS
Status: DISCONTINUED | OUTPATIENT
Start: 2023-04-04 | End: 2023-04-05 | Stop reason: HOSPADM

## 2023-04-04 RX ORDER — FENTANYL CITRATE 50 UG/ML
INJECTION, SOLUTION INTRAMUSCULAR; INTRAVENOUS PRN
Status: DISCONTINUED | OUTPATIENT
Start: 2023-04-04 | End: 2023-04-04

## 2023-04-04 RX ORDER — LIDOCAINE 40 MG/G
CREAM TOPICAL
Status: DISCONTINUED | OUTPATIENT
Start: 2023-04-04 | End: 2023-04-05 | Stop reason: HOSPADM

## 2023-04-04 RX ORDER — SODIUM CHLORIDE, SODIUM LACTATE, POTASSIUM CHLORIDE, CALCIUM CHLORIDE 600; 310; 30; 20 MG/100ML; MG/100ML; MG/100ML; MG/100ML
INJECTION, SOLUTION INTRAVENOUS CONTINUOUS
Status: DISCONTINUED | OUTPATIENT
Start: 2023-04-04 | End: 2023-04-05 | Stop reason: HOSPADM

## 2023-04-04 RX ORDER — NALOXONE HYDROCHLORIDE 0.4 MG/ML
0.4 INJECTION, SOLUTION INTRAMUSCULAR; INTRAVENOUS; SUBCUTANEOUS
Status: DISCONTINUED | OUTPATIENT
Start: 2023-04-04 | End: 2023-04-05 | Stop reason: HOSPADM

## 2023-04-04 RX ORDER — ROPIVACAINE HYDROCHLORIDE 5 MG/ML
INJECTION, SOLUTION EPIDURAL; INFILTRATION; PERINEURAL
Status: COMPLETED | OUTPATIENT
Start: 2023-04-04 | End: 2023-04-04

## 2023-04-04 RX ORDER — PROPOFOL 10 MG/ML
INJECTION, EMULSION INTRAVENOUS PRN
Status: DISCONTINUED | OUTPATIENT
Start: 2023-04-04 | End: 2023-04-04

## 2023-04-04 RX ORDER — HYDROXYZINE HYDROCHLORIDE 10 MG/1
10 TABLET, FILM COATED ORAL EVERY 6 HOURS PRN
Status: DISCONTINUED | OUTPATIENT
Start: 2023-04-04 | End: 2023-04-05 | Stop reason: HOSPADM

## 2023-04-04 RX ORDER — BISACODYL 10 MG
10 SUPPOSITORY, RECTAL RECTAL DAILY PRN
Status: DISCONTINUED | OUTPATIENT
Start: 2023-04-04 | End: 2023-04-05 | Stop reason: HOSPADM

## 2023-04-04 RX ORDER — IBUPROFEN 600 MG/1
600 TABLET, FILM COATED ORAL EVERY 6 HOURS PRN
Qty: 30 TABLET | Refills: 0 | Status: SHIPPED | OUTPATIENT
Start: 2023-04-04

## 2023-04-04 RX ORDER — CEFAZOLIN SODIUM/WATER 2 G/20 ML
2 SYRINGE (ML) INTRAVENOUS
Status: COMPLETED | OUTPATIENT
Start: 2023-04-04 | End: 2023-04-04

## 2023-04-04 RX ORDER — FENTANYL CITRATE 50 UG/ML
50 INJECTION, SOLUTION INTRAMUSCULAR; INTRAVENOUS EVERY 5 MIN PRN
Status: DISCONTINUED | OUTPATIENT
Start: 2023-04-04 | End: 2023-04-04 | Stop reason: HOSPADM

## 2023-04-04 RX ORDER — NALOXONE HYDROCHLORIDE 0.4 MG/ML
0.2 INJECTION, SOLUTION INTRAMUSCULAR; INTRAVENOUS; SUBCUTANEOUS
Status: DISCONTINUED | OUTPATIENT
Start: 2023-04-04 | End: 2023-04-05 | Stop reason: HOSPADM

## 2023-04-04 RX ORDER — DEXAMETHASONE SODIUM PHOSPHATE 4 MG/ML
INJECTION, SOLUTION INTRA-ARTICULAR; INTRALESIONAL; INTRAMUSCULAR; INTRAVENOUS; SOFT TISSUE PRN
Status: DISCONTINUED | OUTPATIENT
Start: 2023-04-04 | End: 2023-04-04

## 2023-04-04 RX ORDER — ONDANSETRON 2 MG/ML
INJECTION INTRAMUSCULAR; INTRAVENOUS PRN
Status: DISCONTINUED | OUTPATIENT
Start: 2023-04-04 | End: 2023-04-04

## 2023-04-04 RX ORDER — PROCHLORPERAZINE MALEATE 5 MG
5 TABLET ORAL EVERY 6 HOURS PRN
Status: DISCONTINUED | OUTPATIENT
Start: 2023-04-04 | End: 2023-04-05 | Stop reason: HOSPADM

## 2023-04-04 RX ORDER — PREGABALIN 75 MG/1
75 CAPSULE ORAL 3 TIMES DAILY
Status: DISCONTINUED | OUTPATIENT
Start: 2023-04-04 | End: 2023-04-05 | Stop reason: HOSPADM

## 2023-04-04 RX ORDER — NIACIN 500 MG
500 TABLET ORAL
Status: DISCONTINUED | OUTPATIENT
Start: 2023-04-05 | End: 2023-04-05 | Stop reason: HOSPADM

## 2023-04-04 RX ORDER — ONDANSETRON 2 MG/ML
4 INJECTION INTRAMUSCULAR; INTRAVENOUS EVERY 6 HOURS PRN
Status: DISCONTINUED | OUTPATIENT
Start: 2023-04-04 | End: 2023-04-05 | Stop reason: HOSPADM

## 2023-04-04 RX ORDER — AMOXICILLIN 250 MG
1 CAPSULE ORAL 2 TIMES DAILY
Status: DISCONTINUED | OUTPATIENT
Start: 2023-04-04 | End: 2023-04-05 | Stop reason: HOSPADM

## 2023-04-04 RX ORDER — HYDROMORPHONE HCL IN WATER/PF 6 MG/30 ML
0.4 PATIENT CONTROLLED ANALGESIA SYRINGE INTRAVENOUS EVERY 5 MIN PRN
Status: DISCONTINUED | OUTPATIENT
Start: 2023-04-04 | End: 2023-04-04 | Stop reason: HOSPADM

## 2023-04-04 RX ORDER — EPHEDRINE SULFATE 50 MG/ML
INJECTION, SOLUTION INTRAMUSCULAR; INTRAVENOUS; SUBCUTANEOUS PRN
Status: DISCONTINUED | OUTPATIENT
Start: 2023-04-04 | End: 2023-04-04

## 2023-04-04 RX ORDER — NEOSTIGMINE METHYLSULFATE 1 MG/ML
VIAL (ML) INJECTION PRN
Status: DISCONTINUED | OUTPATIENT
Start: 2023-04-04 | End: 2023-04-04

## 2023-04-04 RX ORDER — ONDANSETRON 4 MG/1
4 TABLET, ORALLY DISINTEGRATING ORAL EVERY 30 MIN PRN
Status: DISCONTINUED | OUTPATIENT
Start: 2023-04-04 | End: 2023-04-04 | Stop reason: HOSPADM

## 2023-04-04 RX ORDER — CEFAZOLIN SODIUM 1 G/3ML
1 INJECTION, POWDER, FOR SOLUTION INTRAMUSCULAR; INTRAVENOUS EVERY 8 HOURS
Status: COMPLETED | OUTPATIENT
Start: 2023-04-04 | End: 2023-04-04

## 2023-04-04 RX ORDER — CEFAZOLIN SODIUM/WATER 2 G/20 ML
2 SYRINGE (ML) INTRAVENOUS SEE ADMIN INSTRUCTIONS
Status: DISCONTINUED | OUTPATIENT
Start: 2023-04-04 | End: 2023-04-04 | Stop reason: HOSPADM

## 2023-04-04 RX ORDER — HYDROMORPHONE HCL IN WATER/PF 6 MG/30 ML
0.2 PATIENT CONTROLLED ANALGESIA SYRINGE INTRAVENOUS EVERY 5 MIN PRN
Status: DISCONTINUED | OUTPATIENT
Start: 2023-04-04 | End: 2023-04-04 | Stop reason: HOSPADM

## 2023-04-04 RX ORDER — IBUPROFEN 600 MG/1
600 TABLET, FILM COATED ORAL EVERY 6 HOURS PRN
Status: DISCONTINUED | OUTPATIENT
Start: 2023-04-04 | End: 2023-04-05 | Stop reason: HOSPADM

## 2023-04-04 RX ORDER — ONDANSETRON 4 MG/1
4 TABLET, ORALLY DISINTEGRATING ORAL EVERY 6 HOURS PRN
Status: DISCONTINUED | OUTPATIENT
Start: 2023-04-04 | End: 2023-04-05 | Stop reason: HOSPADM

## 2023-04-04 RX ADMIN — SODIUM CHLORIDE, POTASSIUM CHLORIDE, SODIUM LACTATE AND CALCIUM CHLORIDE: 600; 310; 30; 20 INJECTION, SOLUTION INTRAVENOUS at 08:20

## 2023-04-04 RX ADMIN — PHENYLEPHRINE HYDROCHLORIDE 200 MCG: 10 INJECTION INTRAVENOUS at 09:10

## 2023-04-04 RX ADMIN — GLYCOPYRROLATE 0.2 MG: 0.2 INJECTION, SOLUTION INTRAMUSCULAR; INTRAVENOUS at 07:29

## 2023-04-04 RX ADMIN — ACETAMINOPHEN 975 MG: 325 TABLET, FILM COATED ORAL at 14:23

## 2023-04-04 RX ADMIN — ASPIRIN 81 MG: 81 TABLET, COATED ORAL at 20:16

## 2023-04-04 RX ADMIN — BENZOCAINE 6 MG-MENTHOL 10 MG LOZENGES 1 LOZENGE: at 16:48

## 2023-04-04 RX ADMIN — PHENYLEPHRINE HYDROCHLORIDE 0.2 MCG/KG/MIN: 10 INJECTION INTRAVENOUS at 07:57

## 2023-04-04 RX ADMIN — CEFAZOLIN 1 G: 1 INJECTION, POWDER, FOR SOLUTION INTRAMUSCULAR; INTRAVENOUS at 14:23

## 2023-04-04 RX ADMIN — Medication 1.9 G: at 07:40

## 2023-04-04 RX ADMIN — BENZOCAINE 6 MG-MENTHOL 10 MG LOZENGES 1 LOZENGE: at 18:52

## 2023-04-04 RX ADMIN — BUPIVACAINE HYDROCHLORIDE AND EPINEPHRINE BITARTRATE 10 ML: 2.5; .005 INJECTION, SOLUTION INFILTRATION; PERINEURAL at 07:01

## 2023-04-04 RX ADMIN — FENTANYL CITRATE 50 MCG: 50 INJECTION, SOLUTION INTRAMUSCULAR; INTRAVENOUS at 10:17

## 2023-04-04 RX ADMIN — CEFAZOLIN 1 G: 1 INJECTION, POWDER, FOR SOLUTION INTRAMUSCULAR; INTRAVENOUS at 22:02

## 2023-04-04 RX ADMIN — PROPOFOL 50 MCG/KG/MIN: 10 INJECTION, EMULSION INTRAVENOUS at 07:36

## 2023-04-04 RX ADMIN — ONDANSETRON 4 MG: 2 INJECTION INTRAMUSCULAR; INTRAVENOUS at 09:40

## 2023-04-04 RX ADMIN — MIDAZOLAM 2 MG: 1 INJECTION INTRAMUSCULAR; INTRAVENOUS at 07:00

## 2023-04-04 RX ADMIN — BENZOCAINE 6 MG-MENTHOL 10 MG LOZENGES 1 LOZENGE: at 14:25

## 2023-04-04 RX ADMIN — SENNOSIDES AND DOCUSATE SODIUM 1 TABLET: 50; 8.6 TABLET ORAL at 20:16

## 2023-04-04 RX ADMIN — Medication 0.1 G: at 07:34

## 2023-04-04 RX ADMIN — Medication 10 MG: at 09:04

## 2023-04-04 RX ADMIN — NEOSTIGMINE METHYLSULFATE 2 MG: 1 INJECTION, SOLUTION INTRAVENOUS at 09:47

## 2023-04-04 RX ADMIN — GLYCOPYRROLATE 0.4 MG: 0.2 INJECTION, SOLUTION INTRAMUSCULAR; INTRAVENOUS at 09:47

## 2023-04-04 RX ADMIN — OXYCODONE HYDROCHLORIDE 5 MG: 5 TABLET ORAL at 15:44

## 2023-04-04 RX ADMIN — OXYCODONE HYDROCHLORIDE 5 MG: 5 TABLET ORAL at 20:24

## 2023-04-04 RX ADMIN — FENTANYL CITRATE 25 MCG: 50 INJECTION INTRAMUSCULAR; INTRAVENOUS at 12:27

## 2023-04-04 RX ADMIN — HYDROXYZINE HYDROCHLORIDE 10 MG: 10 TABLET, FILM COATED ORAL at 22:02

## 2023-04-04 RX ADMIN — FENTANYL CITRATE 25 MCG: 50 INJECTION INTRAMUSCULAR; INTRAVENOUS at 10:42

## 2023-04-04 RX ADMIN — Medication 10 MG: at 08:55

## 2023-04-04 RX ADMIN — TRANEXAMIC ACID 1950 MG: 650 TABLET ORAL at 06:17

## 2023-04-04 RX ADMIN — PREGABALIN 75 MG: 75 CAPSULE ORAL at 22:01

## 2023-04-04 RX ADMIN — IBUPROFEN 600 MG: 600 TABLET, FILM COATED ORAL at 23:38

## 2023-04-04 RX ADMIN — BENZOCAINE 6 MG-MENTHOL 10 MG LOZENGES 1 LOZENGE: at 20:38

## 2023-04-04 RX ADMIN — DEXAMETHASONE SODIUM PHOSPHATE 4 MG: 4 INJECTION, SOLUTION INTRA-ARTICULAR; INTRALESIONAL; INTRAMUSCULAR; INTRAVENOUS; SOFT TISSUE at 08:23

## 2023-04-04 RX ADMIN — ACETAMINOPHEN 975 MG: 325 TABLET, FILM COATED ORAL at 06:18

## 2023-04-04 RX ADMIN — PREGABALIN 75 MG: 75 CAPSULE ORAL at 17:39

## 2023-04-04 RX ADMIN — FENTANYL CITRATE 100 MCG: 50 INJECTION, SOLUTION INTRAMUSCULAR; INTRAVENOUS at 07:29

## 2023-04-04 RX ADMIN — PHENYLEPHRINE HYDROCHLORIDE 100 MCG: 10 INJECTION INTRAVENOUS at 07:53

## 2023-04-04 RX ADMIN — Medication 5 MG: at 09:16

## 2023-04-04 RX ADMIN — CELECOXIB 400 MG: 200 CAPSULE ORAL at 06:18

## 2023-04-04 RX ADMIN — HYDROMORPHONE HYDROCHLORIDE 0.2 MG: 0.2 INJECTION, SOLUTION INTRAMUSCULAR; INTRAVENOUS; SUBCUTANEOUS at 22:05

## 2023-04-04 RX ADMIN — SODIUM CHLORIDE, POTASSIUM CHLORIDE, SODIUM LACTATE AND CALCIUM CHLORIDE: 600; 310; 30; 20 INJECTION, SOLUTION INTRAVENOUS at 07:19

## 2023-04-04 RX ADMIN — PROPOFOL 150 MG: 10 INJECTION, EMULSION INTRAVENOUS at 07:29

## 2023-04-04 RX ADMIN — OXYCODONE HYDROCHLORIDE 5 MG: 5 TABLET ORAL at 14:24

## 2023-04-04 RX ADMIN — PROPOFOL 50 MCG/KG/MIN: 10 INJECTION, EMULSION INTRAVENOUS at 07:45

## 2023-04-04 RX ADMIN — PHENYLEPHRINE HYDROCHLORIDE 100 MCG: 10 INJECTION INTRAVENOUS at 08:25

## 2023-04-04 RX ADMIN — ROCURONIUM BROMIDE 35 MG: 50 INJECTION, SOLUTION INTRAVENOUS at 07:29

## 2023-04-04 RX ADMIN — LIDOCAINE HYDROCHLORIDE 30 MG: 20 INJECTION, SOLUTION INFILTRATION; PERINEURAL at 07:29

## 2023-04-04 RX ADMIN — ROPIVACAINE HYDROCHLORIDE 10 ML: 5 INJECTION, SOLUTION EPIDURAL; INFILTRATION; PERINEURAL at 07:01

## 2023-04-04 RX ADMIN — CLONAZEPAM 0.5 MG: 0.5 TABLET ORAL at 20:16

## 2023-04-04 RX ADMIN — ACETAMINOPHEN 975 MG: 325 TABLET, FILM COATED ORAL at 22:01

## 2023-04-04 RX ADMIN — HYDROXYZINE HYDROCHLORIDE 10 MG: 10 TABLET, FILM COATED ORAL at 14:23

## 2023-04-04 RX ADMIN — PHENYLEPHRINE HYDROCHLORIDE 100 MCG: 10 INJECTION INTRAVENOUS at 08:43

## 2023-04-04 RX ADMIN — FENTANYL CITRATE 25 MCG: 50 INJECTION INTRAMUSCULAR; INTRAVENOUS at 10:52

## 2023-04-04 ASSESSMENT — ENCOUNTER SYMPTOMS: DYSRHYTHMIAS: 0

## 2023-04-04 ASSESSMENT — ACTIVITIES OF DAILY LIVING (ADL)
ADLS_ACUITY_SCORE: 21
ADLS_ACUITY_SCORE: 24
ADLS_ACUITY_SCORE: 20
ADLS_ACUITY_SCORE: 20
ADLS_ACUITY_SCORE: 24

## 2023-04-04 ASSESSMENT — LIFESTYLE VARIABLES: TOBACCO_USE: 1

## 2023-04-04 NOTE — ANESTHESIA CARE TRANSFER NOTE
Patient: Madelin Story    Procedure: Procedure(s):  Right anatomic total shoulder arthroplasty       Diagnosis: Arthritis of glenohumeral joint [M19.019]  Diagnosis Additional Information: No value filed.    Anesthesia Type:   General     Note:    Oropharynx: oropharynx clear of all foreign objects and spontaneously breathing  Level of Consciousness: awake  Oxygen Supplementation: face mask  Level of Supplemental Oxygen (L/min / FiO2): 6  Independent Airway: airway patency satisfactory and stable  Dentition: dentition unchanged  Vital Signs Stable: post-procedure vital signs reviewed and stable  Report to RN Given: handoff report given  Patient transferred to: PACU    Handoff Report: Identifed the Patient, Identified the Reponsible Provider, Reviewed the pertinent medical history, Discussed the surgical course, Reviewed Intra-OP anesthesia mangement and issues during anesthesia, Set expectations for post-procedure period and Allowed opportunity for questions and acknowledgement of understanding      Vitals:  Vitals Value Taken Time   BP 97/55 04/04/23 1021   Temp 97.6  F (36.4  C) 04/04/23 1024   Pulse 83 04/04/23 1024   Resp 9 04/04/23 1024   SpO2 97 % 04/04/23 1024   Vitals shown include unvalidated device data.    Electronically Signed By: ADALBERTO HAJI CRNA  April 4, 2023  10:24 AM

## 2023-04-04 NOTE — ANESTHESIA PROCEDURE NOTES
Brachial plexus Procedure Note    Pre-Procedure   Staff -        Anesthesiologist:  Tyler Dotson MD       Performed By: anesthesiologist       Referred By: RG Jacome       Location: pre-op       Procedure Start/Stop Times: 4/4/2023 7:01 AM and 4/4/2023 7:09 AM       Pre-Anesthestic Checklist: patient identified, IV checked, site marked, risks and benefits discussed, informed consent, monitors and equipment checked, pre-op evaluation, at physician/surgeon's request and post-op pain management  Timeout:       Correct Patient: Yes        Correct Procedure: Yes        Correct Site: Yes        Correct Position: Yes        Correct Laterality: Yes        Site Marked: Yes  Procedure Documentation  Procedure: Brachial plexus       Laterality: right       Patient Position: supine       Patient Prep/Sterile Barriers: sterile gloves, mask       Skin prep: Betadine (interscalene approach).       Needle Type: insulated and short bevel       Needle Gauge: 21.        Needle Length (Inches): 4        Ultrasound guided       1. Ultrasound was used to identify targeted nerve, plexus, vascular marker, or fascial plane and place a needle adjacent to it in real-time.       2. Ultrasound was used to visualize the spread of anesthetic in close proximity to the above referenced structure.    Assessment/Narrative         The placement was negative for: blood aspirated, painful injection and site bleeding       Paresthesias: No.       Test dose of mL at.         Test dose negative, 3 minutes after injection, for signs of intravascular, subdural, or intrathecal injection.       Bolus given via needle..        Secured via.        Insertion/Infusion Method: Single Shot       Complications: none       Injection made incrementally with aspirations every 3 mL.    Medication(s) Administered   Bupivacaine 0.25% w/ 1:200K Epi (Injection) - Injection   10 mL - 4/4/2023 7:01:00 AM  Ropivacaine 0.5% PF (Infiltration) - Infiltration   10 mL -  "4/4/2023 7:01:00 AM  Medication Administration Time: 4/4/2023 7:01 AM     Comments:  The surgeon has given a verbal order transferring care of this patient to me for the performance of a regional analgesia block for post-op pain control. It is requested of me because I am uniquely trained and qualified to perform this block and the surgeon is neither trained nor qualified to perform this procedure.         FOR [facility] ([facility] [place]) ONLY:   Pain Team Contact information: please page the Pain Team Via [employer]. Search \"Pain\". During daytime hours, please page the attending first. At night please page the resident first.      "

## 2023-04-04 NOTE — PROGRESS NOTES
Arrived from PACU to room 627. A little lethargic but oriented. Vital signs taken. Given instructions on how to use equipment in the bedroom. Right arm is numb and unable to grasp. Unable to move right arm. Full sensation to shoulder noted. Outlined half a dollar size drainage on dressing on arrival to floor. No further drainage seen.     Patient vital signs are at baseline: No,  Reason:  Capno on. Hypotension. Monitoring VS [id number].  Patient able to ambulate as they were prior to admission or with assist devices provided by therapies during their stay:  No,  Reason:  Up to bedside commode with assist by 1 and gait belt to void.  Patient MUST void prior to discharge:  Yes  Patient able to tolerate oral intake:  Yes  Pain has adequate pain control using Oral analgesics:  No,  Reason:  Provided PRN pain medications. Ice applied to shoulder with sling on.  Does patient have an identified :  Yes  Has goal D/C date and time been discussed with patient:  Yes. Anticipating discharge to home tomorrow.    15:45 Attempted to give oxycodone of 5mg due to low BP however unsuccessful to aleviate pain. Another 5mg given after an hour. BP has improved.Latest BP at 130/58.

## 2023-04-04 NOTE — OP NOTE
[facility] Orthopedic Operative Note    Pre-operative diagnosis: Osteoarthritis of the right Shoulder   Post-operative diagnosis: same   Procedure: Total Shoulder Arthoplasty of the right shoulder   Surgeon: Tariq Jacome MD   Assistant(s): RENETTA Saxena who provided retraction and positioning assistance and was crucial for all parts of the case.   Anesthesia: General endotrachial anesthesia with scalene block.   Estimated blood loss: Less than 50 ml   Total IV fluids: (See anesthesia record)   Blood transfusion: No transfusion was given during surgery   Total urine output: (See anesthesia record)   Drains: None   Specimens: None   Implants: EXACTECH EQUINOXE TOTAL SHOULDER SYSTEM SIZE 8 PRESERVE PRESS FIT HUMERAL STEM, 47 x 18 MM HUMERAL HEAD AND SMALL 8 DEGREE POSTERIOR AUGMENTED  POLYETHYLENE GLENOID WITH PRESS-FIT CAGE POST      Findings: ARTHRITIS   Complications: None   Condition: Stable       Activity: Activity as tolerated  Patient may move about with assist as indicated or with supervision     Indications:    Pre-Operative Antibiotics:    Post-Operative DVT Prophylaxis Arthritis of the shoulder refractory to conservative treatment  Ancef 2 gram IV 30 minutes prior to incision with 1.95 gm of TXA oral pre op.      ASA 81 MG PO BID     The patient was taken to the pre operative area, where a scalene block was placed in the  right shoulder.      The patient was then taken to the operating room where general anesthetic was obtained. They were placed in the beach chair position. The right arm was confirmed as the operative arm. Pre operative range of motion was external rotation 30, abduction  80 and elevation to 100 degrees. The arm was prepped and draped in the usual fashion. Timeout was performed, confirming the right shoulder as the operative shoulder. Approximately, a 4 inch incision was made over the anterior aspect of the shoulder extending from the coracoid distal and laterally.  Full-thickness skin flaps were created. The deltopectoral interval was identified and the cephalic vein was retracted medially with the pectoralis and the deltoid was retracted laterally. The subdeltoid space was entered and freed up. The conjoined tendon was identified and released off the subscapularis. The axillary nerve was palpated, identified and protected throughout the rest of the case. The biceps tendon was identified and tenodesed to the superior aspect of the pectoralis tendon. The rotator interval was then incised.  A lesser tuberosity osteotomy was not performed. The subscapularis was then released from the humerus. The biceps tendon was tenotomized as it entered the joint. The subscapularis was tagged and retracted. The inferior aspect of the capsule was then released off the neck of the humerus, giving me excellent exposure. The osteophytes were removed. The humerus was then delivered out of the wound.  I place the 6 mm reamer and the extramedullary guide was placed to resect the head in 30 degrees of retroversion and 130 degrees of inclination. The canal was then broached to accept a size 8 PRESERVE humeral stem. With the trial in place, my humeral osteotomy was then freshened up with a calcar planar.  The humeral head sized to a 47 X 18 and a metal cap was placed to protect the humerus throughout the rest of the glenoid exposure.     I then retracted the humeral head posteriorly. I circumferentially freed up the subscapularis. While retracting the axillary nerve inferiorly, I released the inferior capsule from its attachment on the inferior and posterior glenoid. I resected the labrum circumferentially.  I was able to obtain good visualization of the glenoid, which was completely devoid of any remaining articular cartilage.  Next I placed the array on the coracoid for the computer navigation followed by registering the glenoid.  Once this was completed, I then prepared the glenoid to accept the size  SMALL 8 DEGREE POSTERIOR AUGMENTED caged press fit pegged glenoid. Once the glenoid was placed, I returned my attention to the humerus. With the trial humeral stem in place, I trialed the size  47 X 18 humeral head obtaining the correct offset, I placed it at the correct position which provided excellent coverage. I confirmed the stem size and the humeral head size with fluoroscopy. I then removed the trial components, prepared my real implants on the back table.  I placed two fiber-wire sutures through the lesser tuberosity foot print and around the humeral stem was it was impacted into place.  The stem had excellent fit and fill proximally and with good rotational stability.  I then placed the humeral head in the correct position based on my trial and seated it onto the humeral stem providing excellent coverage.   I confirmed again the seating, sizing and orientation of the humeral implant with fluoroscopy. I was very satisfied. I then carefully repaired the subscapularis to the humerus using multiple #2 Fiber Wire sutures in A HORIZONTAL MATTRESS fashion through bone tunnels and around the stem.  It was all over sewn with 0 Vicryl suture AND 1 STRATAFIX. At the completion of the subscapularis repair, external rotation was 40, abduction 90, elevation 130 degrees. The wound was irrigated with copious normal saline. I placed 1 gram of Vancomycin powder in the sub deltoid space and the deltopectoral interval was then closed with 0 STRATAFIX plus suture. The skin was closed in layers with 0 vicryl in the deep tissues, followed by 2-0 Vicryl plus, 3-0 Monocryl plus and Steri-Strips.   The incision was covered with an Aquacel dressing.  The arm was placed in a sling. They were then awoken and transferred to the recovery room in stable condition.   There were no noted complications. Sponge and needle counts were correct.

## 2023-04-04 NOTE — ANESTHESIA PREPROCEDURE EVALUATION
Anesthesia Pre-Procedure Evaluation    Patient: Madelin Story   MRN: [id number] : 1955        Procedure : Procedure(s):  Right anatomic total shoulder arthroplasty  Versus right  reverse arthroplasty shoulder          Past Medical History:   Diagnosis Date     Anxiety      Arthritis      Hearing problem      Meniere's disease      Osteoporosis      Other chronic pain      Tinnitus       Past Surgical History:   Procedure Laterality Date     GENITOURINARY SURGERY       GYN SURGERY       PE TUBES       SURGICAL HISTORY OF -   05    Diagnostic Lap 2nd to pelvic pain     TONSILLECTOMY        Allergies   Allergen Reactions     Penicillin [Penicillins] Swelling     Sulfa Drugs Nausea and Vomiting     Cefuroxime Nausea and Vomiting     PN: Vomiting     Clindamycin Rash     Gadolinium Derivatives Itching     PN: Just itching. Gadavist given on 14 for MR Brain WWO exam.       Social History     Tobacco Use     Smoking status: Former     Packs/day: 1.00     Years: 10.00     Pack years: 10.00     Types: Cigarettes     Start date: 6/15/1973     Quit date: 1985     Years since quittin.2     Smokeless tobacco: Never   Vaping Use     Vaping status: Not on file   Substance Use Topics     Alcohol use: No      Wt Readings from Last 1 Encounters:   23 54 kg (119 lb)        Anesthesia Evaluation            ROS/MED HX  ENT/Pulmonary:     (+) tobacco use, Past use,     Neurologic: Comment: vertigo      Cardiovascular:     (+) hypertension----- (-) CAD, CHF, arrhythmias, pulmonary hypertension and dyslipidemia   METS/Exercise Tolerance:     Hematologic:       Musculoskeletal:   (+) arthritis,     GI/Hepatic:    (-) GERD   Renal/Genitourinary:  - neg Renal ROS     Endo:  - neg endo ROS     Psychiatric/Substance Use:     (+) psychiatric history anxiety and depression     Infectious Disease:  - neg infectious disease ROS     Malignancy:       Other:      (+) , H/O Chronic Pain,        Physical Exam    Airway         Mallampati: II   TM distance: > 3 FB   Neck ROM: full   Mouth opening: > 3 cm    Respiratory Devices and Support         Dental           Cardiovascular   cardiovascular exam normal          Pulmonary   pulmonary exam normal            Other findings: No lab results found.   No lab results found.    OUTSIDE LABS:  CBC: No results found for: WBC, HGB, HCT, PLT  BMP: No results found for: NA, POTASSIUM, CHLORIDE, CO2, BUN, CR, GLC  COAGS: No results found for: PTT, INR, FIBR  POC: No results found for: BGM, HCG, HCGS  HEPATIC: No results found for: ALBUMIN, PROTTOTAL, ALT, AST, GGT, ALKPHOS, BILITOTAL, BILIDIRECT, BRENDON  OTHER: No results found for: PH, LACT, A1C, TERRY, PHOS, MAG, LIPASE, AMYLASE, TSH, T4, T3, CRP, SED    Anesthesia Plan    ASA Status:  2      Anesthesia Type: General.     - Airway: ETT   Induction: Propofol.   Maintenance: Balanced.        Consents    Anesthesia Plan(s) and associated risks, benefits, and realistic alternatives discussed. Questions answered and patient/representative(s) expressed understanding.    - Discussed:     - Discussed with:  Patient      - Extended Intubation/Ventilatory Support Discussed: No.      - Patient is DNR/DNI Status: No    Use of blood products discussed: No .     Postoperative Care    Pain management: IV analgesics, Oral pain medications, Peripheral nerve block (Single Shot).   PONV prophylaxis: Ondansetron (or other 5HT-3), Background Propofol Infusion     Comments:                Tyler Dotson MD

## 2023-04-04 NOTE — PROGRESS NOTES
SPIRITUAL HEALTH SERVICES  [facility]  PRE-SURGERY VISIT    Pre-surgical visit with pt. And spouse, Perry. Pt is Mandaen.  Provided spiritual support, prayer.   Oriented to Spiritual Health Services and availability for emotional/spiritual support during hospital stay.     Sridhar Gonzalez MA  Staff   [phone]   On Site Tu/We/Th    [facility] remains available 24/7 for emergent requests/referrals, either by having the on-call  paged or by entering an ASAP/STAT consult in Epic (this will also page the on-call ). Routine Epic consults receive an initial response within 24 hours.

## 2023-04-05 ENCOUNTER — APPOINTMENT (OUTPATIENT)
Dept: OCCUPATIONAL THERAPY | Facility: CLINIC | Age: 68
End: 2023-04-05
Attending: ORTHOPAEDIC SURGERY
Payer: COMMERCIAL

## 2023-04-05 VITALS
HEIGHT: 62 IN | OXYGEN SATURATION: 98 % | HEART RATE: 66 BPM | BODY MASS INDEX: 21.9 KG/M2 | SYSTOLIC BLOOD PRESSURE: 106 MMHG | RESPIRATION RATE: 18 BRPM | WEIGHT: 119 LBS | TEMPERATURE: 98.4 F | DIASTOLIC BLOOD PRESSURE: 70 MMHG

## 2023-04-05 LAB
GLUCOSE BLDC GLUCOMTR-MCNC: 81 MG/DL (ref 70–99)
HGB BLD-MCNC: 10.5 G/DL (ref 11.7–15.7)

## 2023-04-05 PROCEDURE — 82962 GLUCOSE BLOOD TEST: CPT

## 2023-04-05 PROCEDURE — 97165 OT EVAL LOW COMPLEX 30 MIN: CPT | Mod: GO

## 2023-04-05 PROCEDURE — 250N000013 HC RX MED GY IP 250 OP 250 PS 637: Performed by: PHYSICIAN ASSISTANT

## 2023-04-05 PROCEDURE — 97110 THERAPEUTIC EXERCISES: CPT | Mod: GO

## 2023-04-05 PROCEDURE — 97535 SELF CARE MNGMENT TRAINING: CPT | Mod: GO

## 2023-04-05 PROCEDURE — 250N000011 HC RX IP 250 OP 636: Performed by: PHYSICIAN ASSISTANT

## 2023-04-05 PROCEDURE — 85018 HEMOGLOBIN: CPT | Performed by: PHYSICIAN ASSISTANT

## 2023-04-05 PROCEDURE — 36415 COLL VENOUS BLD VENIPUNCTURE: CPT | Performed by: PHYSICIAN ASSISTANT

## 2023-04-05 RX ORDER — DIPHENHYDRAMINE HYDROCHLORIDE 50 MG/ML
25 INJECTION INTRAMUSCULAR; INTRAVENOUS EVERY 6 HOURS PRN
Status: DISCONTINUED | OUTPATIENT
Start: 2023-04-05 | End: 2023-04-05 | Stop reason: HOSPADM

## 2023-04-05 RX ORDER — DIPHENHYDRAMINE HCL 25 MG
25 CAPSULE ORAL EVERY 6 HOURS PRN
Status: DISCONTINUED | OUTPATIENT
Start: 2023-04-05 | End: 2023-04-05 | Stop reason: HOSPADM

## 2023-04-05 RX ADMIN — PREGABALIN 75 MG: 75 CAPSULE ORAL at 09:06

## 2023-04-05 RX ADMIN — ASPIRIN 81 MG: 81 TABLET, COATED ORAL at 08:57

## 2023-04-05 RX ADMIN — DIPHENHYDRAMINE HYDROCHLORIDE 25 MG: 25 CAPSULE ORAL at 11:28

## 2023-04-05 RX ADMIN — Medication 500 MG: at 08:56

## 2023-04-05 RX ADMIN — CLONAZEPAM 0.5 MG: 0.5 TABLET ORAL at 09:06

## 2023-04-05 RX ADMIN — OXYCODONE HYDROCHLORIDE 10 MG: 10 TABLET ORAL at 04:55

## 2023-04-05 RX ADMIN — ACETAMINOPHEN 975 MG: 325 TABLET, FILM COATED ORAL at 06:16

## 2023-04-05 RX ADMIN — OXYCODONE HYDROCHLORIDE 10 MG: 10 TABLET ORAL at 01:07

## 2023-04-05 RX ADMIN — POLYETHYLENE GLYCOL 3350 17 G: 17 POWDER, FOR SOLUTION ORAL at 08:57

## 2023-04-05 RX ADMIN — HYDROMORPHONE HYDROCHLORIDE 0.2 MG: 0.2 INJECTION, SOLUTION INTRAMUSCULAR; INTRAVENOUS; SUBCUTANEOUS at 06:49

## 2023-04-05 RX ADMIN — MELOXICAM 7.5 MG: 7.5 TABLET ORAL at 08:57

## 2023-04-05 RX ADMIN — SENNOSIDES AND DOCUSATE SODIUM 1 TABLET: 50; 8.6 TABLET ORAL at 08:57

## 2023-04-05 RX ADMIN — HYDROMORPHONE HYDROCHLORIDE 0.2 MG: 0.2 INJECTION, SOLUTION INTRAMUSCULAR; INTRAVENOUS; SUBCUTANEOUS at 06:44

## 2023-04-05 RX ADMIN — VENLAFAXINE HYDROCHLORIDE 225 MG: 75 CAPSULE, EXTENDED RELEASE ORAL at 08:56

## 2023-04-05 RX ADMIN — HYDROCHLOROTHIAZIDE 25 MG: 25 TABLET ORAL at 08:56

## 2023-04-05 RX ADMIN — HYDROMORPHONE HYDROCHLORIDE 0.4 MG: 0.2 INJECTION, SOLUTION INTRAMUSCULAR; INTRAVENOUS; SUBCUTANEOUS at 00:04

## 2023-04-05 RX ADMIN — OXYCODONE HYDROCHLORIDE 5 MG: 5 TABLET ORAL at 09:20

## 2023-04-05 RX ADMIN — AMLODIPINE BESYLATE 5 MG: 5 TABLET ORAL at 08:57

## 2023-04-05 ASSESSMENT — ACTIVITIES OF DAILY LIVING (ADL)
ADLS_ACUITY_SCORE: 21
ADLS_ACUITY_SCORE: 27
ADLS_ACUITY_SCORE: 21

## 2023-04-05 NOTE — PROGRESS NOTES
"[facility]  Daily Orthopedic Post-Op Note         Assessment and Plan:    Assessment:   Post-operative day #1  Procedure: right TSA   -patient states it was a \"rough night\" with block wearing off  -dressing with silver-dollar sized area of drainage. Disussed that it should be changed if greater than 50% of bandage has drainage or the seal is compromised.  -denies chills, nausea but feels somewhat dizzy  -needing nasal cannula to keep sats up  Pain: 8/10      Plan:   -d\c IV dilaudid-pain control with orals only  -work with OT  -encourage oral fluids and IS  -sling in place while ambulating     Assessment:  Stable post op R TSA  Plan:  Mobilize  Disposition: Home  Anticipated date of discharge: today with          Interval History:   Doing well.  Continues to improve.  Pain is well-controlled.  No fevers.                   Physical Exam:   111/69, 97.2, 70, 18  Dressing clean, dry and intact  Calves soft and non tender  Distal neurovascular exam normal           Data:      Hemoglobin:   Hemoglobin   Date Value Ref Range Status   04/05/2023 10.5 (L) 11.7 - 15.7 g/dL Final       Melissa Olmstead PA-C   [phone]  "

## 2023-04-05 NOTE — PLAN OF CARE
"Pt. Right shoulder surgery 4/4/23. A/O x4 SBA Pain level #8 controled with oxy and Ibuprofen. Down to #5  Her Blood pressure runs low. Started her on O2 2L. Applied ice to R-shoulder. Up voinding and moving to bedside commode. Gave IV dilaudid 0.4mg  At 0649.   Will continue to monitor.     BP 94/66   Pulse 58   Temp 97.8  F (36.6  C) (Temporal)   Resp 16   Ht 1.575 m (5' 2\")   Wt 54 kg (119 lb)   SpO2 96%   BMI 21.77 kg/m           "

## 2023-04-05 NOTE — PROGRESS NOTES
04/05/23 0900   Appointment Info   Signing Clinician's Name / Credentials (OT) Hawa Garland OTR/L   Quick Adds   Quick Adds Certification   Living Environment   People in Home spouse   Current Living Arrangements house   Home Accessibility stairs to enter home;stairs within home   Number of Stairs, Main Entrance 3   Stair Railings, Main Entrance none   Number of Stairs, Within Home, Primary greater than 10 stairs   Stair Railings, Within Home, Primary railing on right side (ascending)   Transportation Anticipated car, drives self;family or friend will provide   Living Environment Comments walk in shower with shower chair and detachable shower head. Pt has SH toilet.   Self-Care   Fall history within last six months yes   Number of times patient has fallen within last six months 1   Activity/Exercise/Self-Care Comment Pt is indep with ADLs and IADLs at baseline; pt reports  can assist with IADLs   General Information   Onset of Illness/Injury or Date of Surgery 04/04/23   Referring Physician Melissa Olmstead PA-C   Patient/Family Therapy Goal Statement (OT) return home with family assist   Additional Occupational Profile Info/Pertinent History of Current Problem Post-operative day #1  Procedure: right TSA   Existing Precautions/Restrictions shoulder;weight bearing   Right Upper Extremity (Weight-bearing Status) non weight-bearing (NWB)   General Observations and Info Pt is receptive to OT suggestions   Cognitive Status Examination   Cognitive Status Comments no cog impairment noted   Pain Assessment   Patient Currently in Pain Yes, see Vital Sign flowsheet  (9/10 with R UE exercises)   Transfers   Transfers sit-stand transfer   Sit-Stand Transfer   Sit-Stand [place] (Transfers) modified independence   Balance   Balance Comments no LOB noted; Pt places her leg onto table to adjust sock while in standing with no LOB   Clinical Impression   Criteria for Skilled Therapeutic Interventions Met (OT)  Yes, treatment indicated   OT Diagnosis R TSA   OT Problem List-Impairments impacting ADL activity tolerance impaired;range of motion (ROM);post-surgical precautions;pain   Assessment of Occupational Performance 5 or more Performance Deficits   Identified Performance Deficits dressing, toileting, showering, functional mobility and IADLs   Planned Therapy Interventions (OT) ADL retraining;IADL retraining;progressive activity/exercise   Clinical Decision Making Complexity (OT) low complexity   Anticipated Equipment Needs Upon Discharge (OT) toileting equipment  (toilet safety frame or raised toilet seat with rails)   Risk & Benefits of therapy have been explained evaluation/treatment results reviewed;care plan/treatment goals reviewed;risks/benefits reviewed;current/potential barriers reviewed;participants voiced agreement with care plan;participants included;patient;spouse/significant other   Clinical Impression Comments Pt has been practicing one handed dressing and toileting; she has supportive  who can assist with ADLs/IADLs as needed   OT Total Evaluation Time   OT Eval, Low Complexity Minutes (98643) 10   Therapy Certification   Medical Diagnosis R TSA   Start of Care Date 04/05/23   Certification date from 04/05/23   Certification date to 04/05/23   OT Goals   Therapy Frequency (OT) One time eval and treatment   OT Predicted Duration/Target Date for Goal Attainment 04/05/23   OT Goals Upper Body Dressing;Lower Body Dressing;OT Goal 1   OT: Upper Body Dressing Supervision/stand-by assist;Completed  (Pt requires max A with UB dressing including button up shirt and sling however  able to assist at home.)   OT: Lower Body Dressing Supervision/stand-by assist;Goal Met   OT: Goal 1 Pt will demonstrate ability to complete RUE exercises per surgeon recommendation. - goal met   Interventions   Interventions Quick Adds Self-Care/Home Management;Therapeutic Procedures/Exercise   Self-Care/Home Management    Self-Care/Home Mgmt/ADL, Compensatory, Meal Prep Minutes (42001) 20   Treatment Detail/Skilled Intervention Pt is SBA with STS. Pt has no concerns with toileting. Pt is SBA with LB dressing. Pt is max A with UB Dressing including button up shirt and sling. Pt educated to dress R arm first and how to comply to precautions.  able to assist at home. Pt declines concerns with toileting; she reports she has been using L hand to wipe for last few weeks. Pt educated to use shower chair at home and to lean forward to let arm dangle to wash and dry under R armpit. Pt is also educated on car transfer per family questions.   Therapeutic Procedures/Exercise   Therapeutic Procedure: strength, endurance, ROM, flexibillity minutes (95144) 26   Treatment Detail/Skilled Intervention Pt is educated in RUE exercises including codman's, elbow flexion/extension, forearm pronation/supination, wrist flexion/extension, wrist radial/ulnar deviation, and fist pumps at 1 set x 10 reps. Pt encouraged to complete 3x/day and to discountinue if causing severe pain and to call surgeon if severe pain continues with RUE exercises.   OT Discharge Planning   OT Plan d/c from OT   OT Discharge Recommendation (DC Rec)   (defer to ortho)   OT Rationale for DC Rec Anticipate pt safe d/c home with family assist   OT Brief overview of current status SBA LB dressing; max A UB dressing; SBA UE exercises   Total Session Time   Timed Code Treatment Minutes 46   Total Session Time (sum of timed and untimed services) 56    [facility]  OUTPATIENT OCCUPATIONAL THERAPY  EVALUATION  PLAN OF TREATMENT FOR OUTPATIENT REHABILITATION  (COMPLETE FOR INITIAL CLAIMS ONLY)  Patient's Last Name, First Name, M.I.  YOB: 1955  Madelin Story                          Provider's Name  [facility] Medical Record No.  [id number]                             Onset Date:  04/04/23   Start of Care  Date:  04/05/23   Type:     ___PT   _X_OT   ___SLP Medical Diagnosis:  R TSA                    OT Diagnosis:  R TSA Visits from SOC:  1     See note for plan of treatment, functional goals and certification details    I CERTIFY THE NEED FOR THESE SERVICES FURNISHED UNDER        THIS PLAN OF TREATMENT AND WHILE UNDER MY CARE     (Physician co-signature of this document indicates review and certification of the therapy plan).

## 2023-04-05 NOTE — PROGRESS NOTES
Cared for pt. [id number]    Patient vital signs are at baseline: Yes  Patient able to ambulate as they were prior to admission or with assist devices provided by therapies during their stay:  Yes  Patient MUST void prior to discharge:  Yes  Patient able to tolerate oral intake:  Yes  Pain has adequate pain control using Oral analgesics:  Yes  Does patient have an identified :  Yes  Has goal D/C date and time been discussed with patient:  Yes    VSS. Afebrile. AO x4. Room air. Sling in place. IV removed.  at bedside. Pt. Stated she feels itchy due to the abx given over night and not due to pain medications. Ortho PA paged. One time dose of benadryl given. Pt. States improvement with time and medication. Discharge paperwork gone over with pt. Questions answered. Discharge medications and belongings sent home with pt. Pt. Left the floor via WC.

## 2023-08-20 ENCOUNTER — HEALTH MAINTENANCE LETTER (OUTPATIENT)
Age: 68
End: 2023-08-20

## 2023-09-05 ENCOUNTER — APPOINTMENT (OUTPATIENT)
Dept: URBAN - METROPOLITAN AREA CLINIC 256 | Age: 68
Setting detail: DERMATOLOGY
End: 2023-09-05

## 2023-09-05 VITALS — WEIGHT: 118 LBS | HEIGHT: 63.5 IN

## 2023-09-05 DIAGNOSIS — D18.0 HEMANGIOMA: ICD-10-CM

## 2023-09-05 DIAGNOSIS — Z71.89 OTHER SPECIFIED COUNSELING: ICD-10-CM

## 2023-09-05 DIAGNOSIS — L60.3 NAIL DYSTROPHY: ICD-10-CM

## 2023-09-05 DIAGNOSIS — L57.8 OTHER SKIN CHANGES DUE TO CHRONIC EXPOSURE TO NONIONIZING RADIATION: ICD-10-CM

## 2023-09-05 DIAGNOSIS — L82.1 OTHER SEBORRHEIC KERATOSIS: ICD-10-CM

## 2023-09-05 DIAGNOSIS — D22 MELANOCYTIC NEVI: ICD-10-CM

## 2023-09-05 PROBLEM — D22.61 MELANOCYTIC NEVI OF RIGHT UPPER LIMB, INCLUDING SHOULDER: Status: ACTIVE | Noted: 2023-09-05

## 2023-09-05 PROBLEM — D22.72 MELANOCYTIC NEVI OF LEFT LOWER LIMB, INCLUDING HIP: Status: ACTIVE | Noted: 2023-09-05

## 2023-09-05 PROBLEM — D18.01 HEMANGIOMA OF SKIN AND SUBCUTANEOUS TISSUE: Status: ACTIVE | Noted: 2023-09-05

## 2023-09-05 PROBLEM — D22.62 MELANOCYTIC NEVI OF LEFT UPPER LIMB, INCLUDING SHOULDER: Status: ACTIVE | Noted: 2023-09-05

## 2023-09-05 PROBLEM — D22.5 MELANOCYTIC NEVI OF TRUNK: Status: ACTIVE | Noted: 2023-09-05

## 2023-09-05 PROBLEM — D22.71 MELANOCYTIC NEVI OF RIGHT LOWER LIMB, INCLUDING HIP: Status: ACTIVE | Noted: 2023-09-05

## 2023-09-05 PROCEDURE — 99203 OFFICE O/P NEW LOW 30 MIN: CPT

## 2023-09-05 PROCEDURE — OTHER ADDITIONAL NOTES: OTHER

## 2023-09-05 PROCEDURE — OTHER COUNSELING: OTHER

## 2023-09-05 PROCEDURE — OTHER MIPS QUALITY: OTHER

## 2023-09-05 ASSESSMENT — LOCATION DETAILED DESCRIPTION DERM
LOCATION DETAILED: LEFT INFERIOR CENTRAL MALAR CHEEK
LOCATION DETAILED: LEFT VENTRAL DISTAL FOREARM
LOCATION DETAILED: LEFT INFERIOR LATERAL MIDBACK
LOCATION DETAILED: LEFT VENTRAL PROXIMAL FOREARM
LOCATION DETAILED: RIGHT ANTERIOR DISTAL THIGH
LOCATION DETAILED: LEFT MEDIAL UPPER BACK
LOCATION DETAILED: LEFT ANTECUBITAL SKIN
LOCATION DETAILED: RIGHT VENTRAL PROXIMAL FOREARM
LOCATION DETAILED: RIGHT VENTRAL DISTAL FOREARM
LOCATION DETAILED: SUPERIOR THORACIC SPINE
LOCATION DETAILED: LEFT ANTERIOR DISTAL THIGH
LOCATION DETAILED: INFERIOR THORACIC SPINE

## 2023-09-05 ASSESSMENT — LOCATION ZONE DERM
LOCATION ZONE: LEG
LOCATION ZONE: TRUNK
LOCATION ZONE: FACE
LOCATION ZONE: ARM

## 2023-09-05 ASSESSMENT — LOCATION SIMPLE DESCRIPTION DERM
LOCATION SIMPLE: LEFT THIGH
LOCATION SIMPLE: LEFT FOREARM
LOCATION SIMPLE: LEFT UPPER BACK
LOCATION SIMPLE: LEFT UPPER ARM
LOCATION SIMPLE: LEFT CHEEK
LOCATION SIMPLE: UPPER BACK
LOCATION SIMPLE: LEFT LOWER BACK
LOCATION SIMPLE: RIGHT FOREARM
LOCATION SIMPLE: RIGHT THIGH

## 2023-09-05 NOTE — PROCEDURE: ADDITIONAL NOTES
Detail Level: Simple
Additional Notes: Recommend patient stop using Clobetasol on her nail and only use Vaseline. There is nothing that can really treat the nail after trauma it will have to grown back and it may grow back dystrophic
Render Risk Assessment In Note?: no

## 2023-09-05 NOTE — PROCEDURE: MIPS QUALITY
Quality 110: Preventive Care And Screening: Influenza Immunization: Influenza Immunization previously received during influenza season
Quality 431: Preventive Care And Screening: Unhealthy Alcohol Use - Screening: Patient not identified as an unhealthy alcohol user when screened for unhealthy alcohol use using a systematic screening method
Quality 47: Advance Care Plan: Advance Care Planning discussed and documented in the medical record; patient did not wish or was not able to name a surrogate decision maker or provide an advance care plan.
Detail Level: Detailed
Quality 111:Pneumonia Vaccination Status For Older Adults: Patient received any pneumococcal conjugate or polysaccharide vaccine on or after their 60th birthday and before the end of the measurement period
Quality 226: Preventive Care And Screening: Tobacco Use: Screening And Cessation Intervention: Patient screened for tobacco use and is an ex/non-smoker
Quality 130: Documentation Of Current Medications In The Medical Record: Current Medications Documented

## 2024-10-13 ENCOUNTER — HEALTH MAINTENANCE LETTER (OUTPATIENT)
Age: 69
End: 2024-10-13

## 2025-04-12 ENCOUNTER — HEALTH MAINTENANCE LETTER (OUTPATIENT)
Age: 70
End: 2025-04-12

## (undated) DEVICE — SPECIMEN CONTAINER 4OZ

## (undated) DEVICE — SPONGE SURGIFOAM 12 1972

## (undated) DEVICE — SOL NACL 0.9% INJ 250ML BAG 2B1322Q

## (undated) DEVICE — DRAPE STERI TOWEL LG 1010

## (undated) DEVICE — DRAPE C-ARM 60X42" 1013

## (undated) DEVICE — PACK SET-UP STD 9102

## (undated) DEVICE — BLADE SAW SAGITTAL STRK 25X90X1.27MM HD SYS 6 6125-127-090

## (undated) DEVICE — BLADE KNIFE SURG 10 371110

## (undated) DEVICE — IMM SHOULDER MED 79-84015

## (undated) DEVICE — GLOVE BIOGEL PI MICRO INDICATOR UNDERGLOVE SZ 7.5 48975

## (undated) DEVICE — BUR OVAL 4X48MM CARBIDE  03-C0901

## (undated) DEVICE — DRAPE SHOULDER PACK SPLITS 29365

## (undated) DEVICE — SUTURE VICRYL+ 2-0 CT-2 27" UND VCP269H

## (undated) DEVICE — BONE CEMENT MIXEVAC III HI VAC KIT  0206-015-000

## (undated) DEVICE — SUCTION MANIFOLD NEPTUNE 2 SYS 4 PORT 0702-020-000

## (undated) DEVICE — ESU GROUND PAD ADULT W/CORD E7507

## (undated) DEVICE — Device

## (undated) DEVICE — KIT NVG EXACTECHGPS V2 RBTC STRL

## (undated) DEVICE — SPONGE RAY-TEC 4X8" 7318

## (undated) DEVICE — SU NDL MAYO 1824-4

## (undated) DEVICE — ESU PENCIL W/SMOKE EVAC CVPLP2000

## (undated) DEVICE — PACK SHOULDER RIDGES

## (undated) DEVICE — SU STRATAFIX PDS PLUS 0 CT-1 18" SXPP1A401

## (undated) DEVICE — SOL NACL 0.9% IRRIG 3000ML BAG 2B7477

## (undated) DEVICE — LINEN POUCH DBL 5427

## (undated) DEVICE — SYR BULB IRRIG 50ML LATEX FREE 0035280

## (undated) DEVICE — LINEN FULL SHEET 5511

## (undated) DEVICE — BAG CLEAR TRASH 1.3M 39X33" P4040C

## (undated) DEVICE — SHOULDER GPS THREADED PIN KIT

## (undated) DEVICE — LINEN ORTHO ACL PACK 5447

## (undated) DEVICE — TRIMANO BEACH CHAIR KIT

## (undated) DEVICE — SUTURE VICRYL+ 0 CT-1 18" DYED VIO VCP740D

## (undated) DEVICE — SET HANDPIECE INTERPULSE W/COAXIAL FAN SPRAY TIP 0210118000

## (undated) DEVICE — ESU CLEANER TIP 31142717

## (undated) DEVICE — GLOVE BIOGEL PI MICRO INDICATOR UNDERGLOVE SZ 6.5 48965

## (undated) DEVICE — GLOVE BIOGEL PI SZ 7.0 40870

## (undated) DEVICE — PREP CHLORAPREP 26ML TINTED HI-LITE ORANGE 930815

## (undated) DEVICE — SPONGE LAP 18X18" X8435

## (undated) DEVICE — SUCTION TIP YANKAUER W/O VENT K86

## (undated) DEVICE — DEVICE RETRIEVER HEWSON 71111579

## (undated) DEVICE — SU STRATAFIX MONOCRYL 3-0 SPIRAL PS-1 45CM SXMP1B102

## (undated) DEVICE — LINEN HALF SHEET 5512

## (undated) DEVICE — SU ORTHOCORD 2 MO7 36" 223104

## (undated) DEVICE — SU STRATAFIX PDS PLUS 1 CT-1 12" SXPP1A443

## (undated) DEVICE — DRSG STERI STRIP 1/2X4" R1547

## (undated) DEVICE — SU FIBERWIRE 2 38" T-8 NDL  AR-7206

## (undated) DEVICE — GLOVE BIOGEL PI SZ 7.5 40875

## (undated) DEVICE — DRSG AQUACEL AG HYDROFIBER  3.5X10" 422605

## (undated) DEVICE — GLOVE BIOGEL PI SZ 6.5 40865

## (undated) DEVICE — GLOVE BIOGEL PI MICRO INDICATOR UNDERGLOVE SZ 7.0 48970

## (undated) DEVICE — DRAPE IOBAN INCISE 23X17" 6650EZ

## (undated) DEVICE — ESU BIPOLAR SEALER AQUAMANTYS 6MM 23-112-1

## (undated) DEVICE — PEN MARKING SKIN W/LABELS 31145884

## (undated) DEVICE — SOL NACL 0.9% IRRIG 1000ML BOTTLE 2F7124

## (undated) DEVICE — KIT SHOULDER POSITIONING BEACH CHAIR ARM HOLDER AR-1644

## (undated) RX ORDER — VANCOMYCIN HYDROCHLORIDE 1 G/20ML
INJECTION, POWDER, LYOPHILIZED, FOR SOLUTION INTRAVENOUS
Status: DISPENSED
Start: 2023-04-04

## (undated) RX ORDER — FENTANYL CITRATE 50 UG/ML
INJECTION, SOLUTION INTRAMUSCULAR; INTRAVENOUS
Status: DISPENSED
Start: 2023-04-04

## (undated) RX ORDER — PROPOFOL 10 MG/ML
INJECTION, EMULSION INTRAVENOUS
Status: DISPENSED
Start: 2023-04-04

## (undated) RX ORDER — LIDOCAINE HYDROCHLORIDE 10 MG/ML
INJECTION, SOLUTION EPIDURAL; INFILTRATION; INTRACAUDAL; PERINEURAL
Status: DISPENSED
Start: 2023-04-04

## (undated) RX ORDER — CEFAZOLIN SODIUM/WATER 2 G/20 ML
SYRINGE (ML) INTRAVENOUS
Status: DISPENSED
Start: 2023-04-04

## (undated) RX ORDER — GLYCOPYRROLATE 0.2 MG/ML
INJECTION INTRAMUSCULAR; INTRAVENOUS
Status: DISPENSED
Start: 2023-04-04

## (undated) RX ORDER — ACETAMINOPHEN 325 MG/1
TABLET ORAL
Status: DISPENSED
Start: 2023-04-04

## (undated) RX ORDER — DEXAMETHASONE SODIUM PHOSPHATE 4 MG/ML
INJECTION, SOLUTION INTRA-ARTICULAR; INTRALESIONAL; INTRAMUSCULAR; INTRAVENOUS; SOFT TISSUE
Status: DISPENSED
Start: 2023-04-04

## (undated) RX ORDER — CELECOXIB 200 MG/1
CAPSULE ORAL
Status: DISPENSED
Start: 2023-04-04

## (undated) RX ORDER — NEOSTIGMINE METHYLSULFATE 1 MG/ML
VIAL (ML) INJECTION
Status: DISPENSED
Start: 2023-04-04

## (undated) RX ORDER — FENTANYL CITRATE-0.9 % NACL/PF 10 MCG/ML
PLASTIC BAG, INJECTION (ML) INTRAVENOUS
Status: DISPENSED
Start: 2023-04-04

## (undated) RX ORDER — EPHEDRINE SULFATE 50 MG/ML
INJECTION, SOLUTION INTRAMUSCULAR; INTRAVENOUS; SUBCUTANEOUS
Status: DISPENSED
Start: 2023-04-04

## (undated) RX ORDER — TRANEXAMIC ACID 650 MG/1
TABLET ORAL
Status: DISPENSED
Start: 2023-04-04

## (undated) RX ORDER — ONDANSETRON 2 MG/ML
INJECTION INTRAMUSCULAR; INTRAVENOUS
Status: DISPENSED
Start: 2023-04-04